# Patient Record
Sex: FEMALE | Race: WHITE | Employment: UNEMPLOYED | URBAN - METROPOLITAN AREA
[De-identification: names, ages, dates, MRNs, and addresses within clinical notes are randomized per-mention and may not be internally consistent; named-entity substitution may affect disease eponyms.]

---

## 2017-03-16 ENCOUNTER — TELEPHONE (OUTPATIENT)
Dept: INTERNAL MEDICINE CLINIC | Age: 52
End: 2017-03-16

## 2017-03-16 DIAGNOSIS — Z13.9 SCREENING: Primary | ICD-10-CM

## 2017-03-17 RX ORDER — LEVOTHYROXINE SODIUM 0.2 MG/1
TABLET ORAL
Qty: 30 TABLET | Refills: 1 | Status: SHIPPED | OUTPATIENT
Start: 2017-03-17 | End: 2017-04-07 | Stop reason: SDUPTHER

## 2017-04-07 ENCOUNTER — OFFICE VISIT (OUTPATIENT)
Dept: INTERNAL MEDICINE CLINIC | Age: 52
End: 2017-04-07
Payer: COMMERCIAL

## 2017-04-07 VITALS
HEIGHT: 62 IN | HEART RATE: 77 BPM | OXYGEN SATURATION: 98 % | DIASTOLIC BLOOD PRESSURE: 78 MMHG | BODY MASS INDEX: 35.51 KG/M2 | SYSTOLIC BLOOD PRESSURE: 130 MMHG | RESPIRATION RATE: 16 BRPM | TEMPERATURE: 98.2 F | WEIGHT: 193 LBS

## 2017-04-07 DIAGNOSIS — G43.009 NONINTRACTABLE MIGRAINE, UNSPECIFIED MIGRAINE TYPE: Primary | ICD-10-CM

## 2017-04-07 DIAGNOSIS — J31.0 CHRONIC RHINITIS: ICD-10-CM

## 2017-04-07 DIAGNOSIS — J45.21 MILD INTERMITTENT ASTHMA WITH ACUTE EXACERBATION: ICD-10-CM

## 2017-04-07 DIAGNOSIS — M46.1 SACROILIITIS (HCC): ICD-10-CM

## 2017-04-07 DIAGNOSIS — L30.9 DERMATITIS: ICD-10-CM

## 2017-04-07 DIAGNOSIS — Z13.29 THYROID DISORDER SCREENING: ICD-10-CM

## 2017-04-07 DIAGNOSIS — E55.9 VITAMIN D DEFICIENCY: ICD-10-CM

## 2017-04-07 DIAGNOSIS — R55 NEUROCARDIOGENIC SYNCOPE: ICD-10-CM

## 2017-04-07 DIAGNOSIS — M79.7 FIBROMYALGIA: ICD-10-CM

## 2017-04-07 DIAGNOSIS — K59.04 CHRONIC IDIOPATHIC CONSTIPATION: ICD-10-CM

## 2017-04-07 DIAGNOSIS — E03.9 ACQUIRED HYPOTHYROIDISM: ICD-10-CM

## 2017-04-07 DIAGNOSIS — G40.A09 PETIT MAL (HCC): ICD-10-CM

## 2017-04-07 DIAGNOSIS — M54.17 LUMBOSACRAL RADICULOPATHY AT S1: ICD-10-CM

## 2017-04-07 DIAGNOSIS — J45.20 MILD INTERMITTENT ASTHMA WITHOUT COMPLICATION: ICD-10-CM

## 2017-04-07 PROCEDURE — 90670 PCV13 VACCINE IM: CPT | Performed by: FAMILY MEDICINE

## 2017-04-07 PROCEDURE — 99214 OFFICE O/P EST MOD 30 MIN: CPT | Performed by: FAMILY MEDICINE

## 2017-04-07 PROCEDURE — G0009 ADMIN PNEUMOCOCCAL VACCINE: HCPCS | Performed by: FAMILY MEDICINE

## 2017-04-07 RX ORDER — DESOXIMETASONE 2.5 MG/G
CREAM TOPICAL
Qty: 60 G | Refills: 1 | Status: SHIPPED | OUTPATIENT
Start: 2017-04-07 | End: 2017-12-08 | Stop reason: ALTCHOICE

## 2017-04-07 RX ORDER — ERGOCALCIFEROL 1.25 MG/1
50000 CAPSULE ORAL WEEKLY
Qty: 30 CAPSULE | Refills: 3 | Status: SHIPPED | OUTPATIENT
Start: 2017-04-07 | End: 2017-12-08 | Stop reason: SDUPTHER

## 2017-04-07 RX ORDER — CYCLOBENZAPRINE HCL 5 MG
5 TABLET ORAL 2 TIMES DAILY PRN
Qty: 30 TABLET | Refills: 5 | Status: SHIPPED | OUTPATIENT
Start: 2017-04-07

## 2017-04-07 RX ORDER — VITAMIN A ACETATE, BETA CAROTENE, ASCORBIC ACID, CHOLECALCIFEROL, .ALPHA.-TOCOPHEROL ACETATE, DL-, THIAMINE MONONITRATE, RIBOFLAVIN, NIACINAMIDE, PYRIDOXINE HYDROCHLORIDE, FOLIC ACID, CYANOCOBALAMIN, CALCIUM CARBONATE, FERROUS FUMARATE, ZINC OXIDE, CUPRIC OXIDE 3080; 12; 120; 400; 1; 1.84; 3; 20; 22; 920; 25; 200; 27; 10; 2 [IU]/1; UG/1; MG/1; [IU]/1; MG/1; MG/1; MG/1; MG/1; MG/1; [IU]/1; MG/1; MG/1; MG/1; MG/1; MG/1
1 TABLET, FILM COATED ORAL DAILY
Qty: 30 TABLET | Refills: 5 | Status: SHIPPED | OUTPATIENT
Start: 2017-04-07 | End: 2017-12-08 | Stop reason: SDUPTHER

## 2017-04-07 RX ORDER — LORATADINE 10 MG/1
10 TABLET ORAL DAILY
Qty: 30 TABLET | Refills: 1 | Status: SHIPPED | OUTPATIENT
Start: 2017-04-07 | End: 2017-12-08 | Stop reason: ALTCHOICE

## 2017-04-07 RX ORDER — ATENOLOL 25 MG/1
TABLET ORAL
Qty: 30 TABLET | Refills: 3 | Status: SHIPPED | OUTPATIENT
Start: 2017-04-07 | End: 2017-07-31 | Stop reason: SDUPTHER

## 2017-04-07 RX ORDER — FLUTICASONE PROPIONATE 110 UG/1
AEROSOL, METERED RESPIRATORY (INHALATION)
Qty: 12 G | Refills: 3 | Status: SHIPPED | OUTPATIENT
Start: 2017-04-07 | End: 2017-12-08

## 2017-04-07 RX ORDER — TRAMADOL HYDROCHLORIDE 50 MG/1
50 TABLET ORAL EVERY 6 HOURS PRN
Qty: 20 TABLET | Refills: 1 | Status: SHIPPED | OUTPATIENT
Start: 2017-04-07 | End: 2017-04-17

## 2017-04-07 RX ORDER — DULOXETIN HYDROCHLORIDE 60 MG/1
CAPSULE, DELAYED RELEASE ORAL
Qty: 30 CAPSULE | Refills: 5 | Status: SHIPPED | OUTPATIENT
Start: 2017-04-07 | End: 2017-09-30 | Stop reason: SDUPTHER

## 2017-04-07 RX ORDER — LEVOTHYROXINE SODIUM 0.2 MG/1
TABLET ORAL
Qty: 30 TABLET | Refills: 5 | Status: SHIPPED | OUTPATIENT
Start: 2017-04-07 | End: 2017-07-16

## 2017-04-07 RX ORDER — ALBUTEROL SULFATE 90 UG/1
AEROSOL, METERED RESPIRATORY (INHALATION)
Qty: 1 INHALER | Refills: 5 | Status: SHIPPED | OUTPATIENT
Start: 2017-04-07 | End: 2017-12-08 | Stop reason: SDUPTHER

## 2017-04-07 ASSESSMENT — ENCOUNTER SYMPTOMS
STRIDOR: 0
BACK PAIN: 0
EYE REDNESS: 0
FACIAL SWELLING: 0
TROUBLE SWALLOWING: 0
EYE DISCHARGE: 0
ABDOMINAL PAIN: 0
CHEST TIGHTNESS: 0
CONSTIPATION: 0
SHORTNESS OF BREATH: 0
COLOR CHANGE: 0
WHEEZING: 0
ABDOMINAL DISTENTION: 0
ANAL BLEEDING: 0
EYE PAIN: 0
SORE THROAT: 0
COUGH: 0

## 2017-04-07 ASSESSMENT — PATIENT HEALTH QUESTIONNAIRE - PHQ9
SUM OF ALL RESPONSES TO PHQ QUESTIONS 1-9: 0
SUM OF ALL RESPONSES TO PHQ9 QUESTIONS 1 & 2: 0
1. LITTLE INTEREST OR PLEASURE IN DOING THINGS: 0

## 2017-04-10 ENCOUNTER — TELEPHONE (OUTPATIENT)
Dept: INTERNAL MEDICINE CLINIC | Age: 52
End: 2017-04-10

## 2017-04-18 ENCOUNTER — TELEPHONE (OUTPATIENT)
Dept: INTERNAL MEDICINE CLINIC | Age: 52
End: 2017-04-18

## 2017-07-15 ENCOUNTER — HOSPITAL ENCOUNTER (OUTPATIENT)
Age: 52
Discharge: HOME | End: 2017-07-15
Payer: COMMERCIAL

## 2017-07-15 DIAGNOSIS — Z13.9 SCREENING: ICD-10-CM

## 2017-07-15 LAB
THYROXINE, FREE: 2.01 NG/DL (ref 0.93–1.7)
TSH SERPL DL<=0.05 MIU/L-ACNC: 0.01 MIU/L (ref 0.3–5)

## 2017-07-15 PROCEDURE — 36415 COLL VENOUS BLD VENIPUNCTURE: CPT

## 2017-07-15 PROCEDURE — 84443 ASSAY THYROID STIM HORMONE: CPT

## 2017-07-15 PROCEDURE — 84439 ASSAY OF FREE THYROXINE: CPT

## 2017-07-16 DIAGNOSIS — E03.9 ACQUIRED HYPOTHYROIDISM: Primary | ICD-10-CM

## 2017-07-16 RX ORDER — LEVOTHYROXINE SODIUM 0.15 MG/1
150 TABLET ORAL DAILY
Qty: 30 TABLET | Refills: 3 | Status: SHIPPED | OUTPATIENT
Start: 2017-07-16 | End: 2017-07-26 | Stop reason: CLARIF

## 2017-07-26 RX ORDER — LEVOTHYROXINE SODIUM 0.15 MG/1
150 TABLET ORAL DAILY
Qty: 30 TABLET | Refills: 3 | Status: SHIPPED | OUTPATIENT
Start: 2017-07-26 | End: 2017-12-08 | Stop reason: DRUGHIGH

## 2017-07-31 DIAGNOSIS — G43.009 NONINTRACTABLE MIGRAINE, UNSPECIFIED MIGRAINE TYPE: ICD-10-CM

## 2017-07-31 RX ORDER — ATENOLOL 25 MG/1
TABLET ORAL
Qty: 30 TABLET | Refills: 3 | Status: SHIPPED | OUTPATIENT
Start: 2017-07-31 | End: 2018-08-22 | Stop reason: ALTCHOICE

## 2017-09-14 ENCOUNTER — HOSPITAL ENCOUNTER (OUTPATIENT)
Age: 52
Discharge: HOME | End: 2017-09-14
Payer: COMMERCIAL

## 2017-09-14 DIAGNOSIS — E03.9 ACQUIRED HYPOTHYROIDISM: ICD-10-CM

## 2017-09-14 LAB
THYROXINE, FREE: 1.66 NG/DL (ref 0.93–1.7)
TSH SERPL DL<=0.05 MIU/L-ACNC: 0.03 MIU/L (ref 0.3–5)

## 2017-09-14 PROCEDURE — 36415 COLL VENOUS BLD VENIPUNCTURE: CPT

## 2017-09-14 PROCEDURE — 84439 ASSAY OF FREE THYROXINE: CPT

## 2017-09-14 PROCEDURE — 84443 ASSAY THYROID STIM HORMONE: CPT

## 2017-09-14 RX ORDER — LEVOTHYROXINE SODIUM 0.12 MG/1
125 TABLET ORAL DAILY
Qty: 30 TABLET | Refills: 5 | Status: SHIPPED | OUTPATIENT
Start: 2017-09-14 | End: 2017-12-08 | Stop reason: SDUPTHER

## 2017-09-15 ENCOUNTER — TELEPHONE (OUTPATIENT)
Dept: INTERNAL MEDICINE CLINIC | Age: 52
End: 2017-09-15

## 2017-09-15 DIAGNOSIS — Z13.29 THYROID DISORDER SCREENING: Primary | ICD-10-CM

## 2017-10-02 RX ORDER — DULOXETIN HYDROCHLORIDE 60 MG/1
CAPSULE, DELAYED RELEASE ORAL
Qty: 30 CAPSULE | Refills: 0 | Status: SHIPPED | OUTPATIENT
Start: 2017-10-02 | End: 2017-12-08 | Stop reason: SDUPTHER

## 2017-12-06 ENCOUNTER — TELEPHONE (OUTPATIENT)
Dept: INTERNAL MEDICINE CLINIC | Age: 52
End: 2017-12-06

## 2017-12-08 ENCOUNTER — OFFICE VISIT (OUTPATIENT)
Dept: INTERNAL MEDICINE CLINIC | Age: 52
End: 2017-12-08
Payer: COMMERCIAL

## 2017-12-08 VITALS
OXYGEN SATURATION: 98 % | DIASTOLIC BLOOD PRESSURE: 88 MMHG | HEIGHT: 62 IN | WEIGHT: 198.2 LBS | RESPIRATION RATE: 16 BRPM | SYSTOLIC BLOOD PRESSURE: 130 MMHG | HEART RATE: 68 BPM | BODY MASS INDEX: 36.47 KG/M2

## 2017-12-08 DIAGNOSIS — Z12.39 SCREENING BREAST EXAMINATION: ICD-10-CM

## 2017-12-08 DIAGNOSIS — G89.4 CHRONIC PAIN SYNDROME: ICD-10-CM

## 2017-12-08 DIAGNOSIS — J45.20 MILD INTERMITTENT ASTHMA WITHOUT COMPLICATION: ICD-10-CM

## 2017-12-08 DIAGNOSIS — R51.9 CHRONIC DAILY HEADACHE: ICD-10-CM

## 2017-12-08 DIAGNOSIS — I10 ESSENTIAL HYPERTENSION: ICD-10-CM

## 2017-12-08 DIAGNOSIS — K59.04 CHRONIC IDIOPATHIC CONSTIPATION: ICD-10-CM

## 2017-12-08 DIAGNOSIS — M54.17 LUMBOSACRAL RADICULOPATHY AT S1: ICD-10-CM

## 2017-12-08 DIAGNOSIS — R53.83 OTHER FATIGUE: Primary | ICD-10-CM

## 2017-12-08 DIAGNOSIS — E55.9 VITAMIN D DEFICIENCY: ICD-10-CM

## 2017-12-08 DIAGNOSIS — J30.9 CHRONIC ALLERGIC RHINITIS, UNSPECIFIED SEASONALITY, UNSPECIFIED TRIGGER: ICD-10-CM

## 2017-12-08 DIAGNOSIS — E03.8 OTHER SPECIFIED HYPOTHYROIDISM: ICD-10-CM

## 2017-12-08 PROCEDURE — 3014F SCREEN MAMMO DOC REV: CPT | Performed by: FAMILY MEDICINE

## 2017-12-08 PROCEDURE — G8427 DOCREV CUR MEDS BY ELIG CLIN: HCPCS | Performed by: FAMILY MEDICINE

## 2017-12-08 PROCEDURE — 99214 OFFICE O/P EST MOD 30 MIN: CPT | Performed by: FAMILY MEDICINE

## 2017-12-08 PROCEDURE — 90732 PPSV23 VACC 2 YRS+ SUBQ/IM: CPT | Performed by: FAMILY MEDICINE

## 2017-12-08 PROCEDURE — 4004F PT TOBACCO SCREEN RCVD TLK: CPT | Performed by: FAMILY MEDICINE

## 2017-12-08 PROCEDURE — G0008 ADMIN INFLUENZA VIRUS VAC: HCPCS | Performed by: FAMILY MEDICINE

## 2017-12-08 PROCEDURE — G0009 ADMIN PNEUMOCOCCAL VACCINE: HCPCS | Performed by: FAMILY MEDICINE

## 2017-12-08 PROCEDURE — 3017F COLORECTAL CA SCREEN DOC REV: CPT | Performed by: FAMILY MEDICINE

## 2017-12-08 PROCEDURE — G8484 FLU IMMUNIZE NO ADMIN: HCPCS | Performed by: FAMILY MEDICINE

## 2017-12-08 PROCEDURE — G8417 CALC BMI ABV UP PARAM F/U: HCPCS | Performed by: FAMILY MEDICINE

## 2017-12-08 PROCEDURE — 90688 IIV4 VACCINE SPLT 0.5 ML IM: CPT | Performed by: FAMILY MEDICINE

## 2017-12-08 RX ORDER — FAMOTIDINE 10 MG
10 TABLET ORAL 2 TIMES DAILY
COMMUNITY

## 2017-12-08 RX ORDER — DULOXETIN HYDROCHLORIDE 60 MG/1
60 CAPSULE, DELAYED RELEASE ORAL DAILY
Qty: 30 CAPSULE | Refills: 2 | Status: SHIPPED | OUTPATIENT
Start: 2017-12-08 | End: 2018-04-22 | Stop reason: SDUPTHER

## 2017-12-08 RX ORDER — LEVOTHYROXINE SODIUM 0.12 MG/1
125 TABLET ORAL DAILY
Qty: 30 TABLET | Refills: 5 | Status: SHIPPED | OUTPATIENT
Start: 2017-12-08 | End: 2018-08-07 | Stop reason: SDUPTHER

## 2017-12-08 RX ORDER — ERGOCALCIFEROL 1.25 MG/1
50000 CAPSULE ORAL WEEKLY
Qty: 30 CAPSULE | Refills: 3 | Status: SHIPPED | OUTPATIENT
Start: 2017-12-08 | End: 2018-12-10 | Stop reason: SDUPTHER

## 2017-12-08 RX ORDER — VITAMIN A ACETATE, BETA CAROTENE, ASCORBIC ACID, CHOLECALCIFEROL, .ALPHA.-TOCOPHEROL ACETATE, DL-, THIAMINE MONONITRATE, RIBOFLAVIN, NIACINAMIDE, PYRIDOXINE HYDROCHLORIDE, FOLIC ACID, CYANOCOBALAMIN, CALCIUM CARBONATE, FERROUS FUMARATE, ZINC OXIDE, CUPRIC OXIDE 3080; 12; 120; 400; 1; 1.84; 3; 20; 22; 920; 25; 200; 27; 10; 2 [IU]/1; UG/1; MG/1; [IU]/1; MG/1; MG/1; MG/1; MG/1; MG/1; [IU]/1; MG/1; MG/1; MG/1; MG/1; MG/1
1 TABLET, FILM COATED ORAL DAILY
Qty: 30 TABLET | Refills: 5 | Status: SHIPPED | OUTPATIENT
Start: 2017-12-08 | End: 2018-01-17 | Stop reason: ALTCHOICE

## 2017-12-08 RX ORDER — ALBUTEROL SULFATE 90 UG/1
AEROSOL, METERED RESPIRATORY (INHALATION)
Qty: 1 INHALER | Refills: 5 | Status: SHIPPED | OUTPATIENT
Start: 2017-12-08

## 2017-12-08 RX ORDER — FLUTICASONE PROPIONATE 50 MCG
1 SPRAY, SUSPENSION (ML) NASAL DAILY
Qty: 1 BOTTLE | Refills: 3 | Status: SHIPPED | OUTPATIENT
Start: 2017-12-08 | End: 2018-08-17 | Stop reason: ALTCHOICE

## 2017-12-08 ASSESSMENT — ENCOUNTER SYMPTOMS
EYES NEGATIVE: 1
GASTROINTESTINAL NEGATIVE: 1
RESPIRATORY NEGATIVE: 1
ALLERGIC/IMMUNOLOGIC NEGATIVE: 1

## 2017-12-08 NOTE — PROGRESS NOTES
chronic pain syndrome. She denies suicidality   Vitals reviewed. Assessment:      1. Other fatigue  Lipid Panel    TSH With Reflex Ft4    CBC With Auto Differential    Comprehensive Metabolic Panel    NORMA DIGITAL SCREEN W OR WO CAD BILATERAL    Hepatitis C Antibody    HIV Screen    Hepatitis Panel, Acute    HIV Rapid 1&2    T. pallidum Ab    VDRL, QUANTITATIVE    Hemoglobin A1C   2. Essential hypertension  Lipid Panel    TSH With Reflex Ft4    CBC With Auto Differential    Comprehensive Metabolic Panel    NORMA DIGITAL SCREEN W OR WO CAD BILATERAL    Hepatitis C Antibody    HIV Screen    Hepatitis Panel, Acute    HIV Rapid 1&2    T. pallidum Ab    VDRL, QUANTITATIVE    Hemoglobin A1C   3. Mild intermittent asthma without complication  albuterol sulfate HFA (PROAIR HFA) 108 (90 Base) MCG/ACT inhaler   4. Screening breast examination     5. Other specified hypothyroidism     6. Vitamin D deficiency  vitamin D (ERGOCALCIFEROL) 76968 units CAPS capsule    Prenatal Vit-Fe Fumarate-FA (PRENATAL PLUS) 27-1 MG TABS   7. Chronic idiopathic constipation  linaclotide (LINZESS) 145 MCG capsule   8. Chronic allergic rhinitis, unspecified seasonality, unspecified trigger  fluticasone (FLONASE) 50 MCG/ACT nasal spray   9. Chronic pain syndrome  [facility]    XR LUMBAR SPINE (2-3 VIEWS)   10. Chronic daily headache  Lipid Panel    TSH With Reflex Ft4    CBC With Auto Differential    Comprehensive Metabolic Panel    NORMA DIGITAL SCREEN W OR WO CAD BILATERAL    Hepatitis C Antibody    HIV Screen    Hepatitis Panel, Acute    HIV Rapid 1&2    T. pallidum Ab    VDRL, QUANTITATIVE    Hemoglobin A1C    [facility]   11. Lumbosacral radiculopathy at S1  [facility]    XR LUMBAR SPINE (2-3 VIEWS)           Plan:      51-year-old overweight  female is presented to reestablish.   This is the first time I'm seeing this individual.  Subjective complaint of fatigue is

## 2017-12-13 ENCOUNTER — TELEPHONE (OUTPATIENT)
Dept: INTERNAL MEDICINE CLINIC | Age: 52
End: 2017-12-13

## 2017-12-16 ENCOUNTER — HOSPITAL ENCOUNTER (OUTPATIENT)
Age: 52
Discharge: HOME | End: 2017-12-16
Payer: COMMERCIAL

## 2017-12-16 ENCOUNTER — HOSPITAL ENCOUNTER (OUTPATIENT)
Dept: GENERAL RADIOLOGY | Age: 52
Discharge: HOME | End: 2017-12-16
Payer: COMMERCIAL

## 2017-12-16 DIAGNOSIS — R51.9 CHRONIC DAILY HEADACHE: ICD-10-CM

## 2017-12-16 DIAGNOSIS — I10 ESSENTIAL HYPERTENSION: ICD-10-CM

## 2017-12-16 DIAGNOSIS — M54.17 LUMBOSACRAL RADICULOPATHY AT S1: ICD-10-CM

## 2017-12-16 DIAGNOSIS — R53.83 OTHER FATIGUE: ICD-10-CM

## 2017-12-16 DIAGNOSIS — G89.4 CHRONIC PAIN SYNDROME: ICD-10-CM

## 2017-12-16 LAB
ABSOLUTE EOS #: 0.5 K/UL (ref 0–0.4)
ABSOLUTE IMMATURE GRANULOCYTE: ABNORMAL K/UL (ref 0–0.3)
ABSOLUTE LYMPH #: 2.3 K/UL (ref 1–4.8)
ABSOLUTE MONO #: 0.5 K/UL (ref 0.1–1.3)
ALBUMIN SERPL-MCNC: 3.6 G/DL (ref 3.5–5.2)
ALBUMIN/GLOBULIN RATIO: NORMAL (ref 1–2.5)
ALP BLD-CCNC: 99 U/L (ref 35–104)
ALT SERPL-CCNC: 11 U/L (ref 5–33)
ANION GAP SERPL CALCULATED.3IONS-SCNC: 11 MMOL/L (ref 9–17)
AST SERPL-CCNC: 19 U/L
BASOPHILS # BLD: 1 % (ref 0–2)
BASOPHILS ABSOLUTE: 0.1 K/UL (ref 0–0.2)
BILIRUB SERPL-MCNC: 0.33 MG/DL (ref 0.3–1.2)
BUN BLDV-MCNC: 6 MG/DL (ref 6–20)
BUN/CREAT BLD: NORMAL (ref 9–20)
CALCIUM SERPL-MCNC: 9.2 MG/DL (ref 8.6–10.4)
CHLORIDE BLD-SCNC: 100 MMOL/L (ref 98–107)
CHOLESTEROL/HDL RATIO: 3.6
CHOLESTEROL: 208 MG/DL
CO2: 26 MMOL/L (ref 20–31)
CREAT SERPL-MCNC: 0.6 MG/DL (ref 0.5–0.9)
DIFFERENTIAL TYPE: ABNORMAL
EOSINOPHILS RELATIVE PERCENT: 8 % (ref 0–4)
GFR AFRICAN AMERICAN: >60 ML/MIN
GFR NON-AFRICAN AMERICAN: >60 ML/MIN
GFR SERPL CREATININE-BSD FRML MDRD: NORMAL ML/MIN/{1.73_M2}
GFR SERPL CREATININE-BSD FRML MDRD: NORMAL ML/MIN/{1.73_M2}
GLUCOSE BLD-MCNC: 86 MG/DL (ref 70–99)
HAV IGM SER IA-ACNC: NONREACTIVE
HCT VFR BLD CALC: 36.8 % (ref 36–46)
HDLC SERPL-MCNC: 58 MG/DL
HEMOGLOBIN: 12.1 G/DL (ref 12–16)
HEPATITIS B CORE IGM ANTIBODY: NONREACTIVE
HEPATITIS B SURFACE ANTIGEN: NONREACTIVE
HEPATITIS C ANTIBODY: NONREACTIVE
HIV AG/AB: NONREACTIVE
IMMATURE GRANULOCYTES: ABNORMAL %
LDL CHOLESTEROL: 133 MG/DL (ref 0–130)
LYMPHOCYTES # BLD: 37 % (ref 24–44)
MCH RBC QN AUTO: 28 PG (ref 26–34)
MCHC RBC AUTO-ENTMCNC: 32.9 G/DL (ref 31–37)
MCV RBC AUTO: 84.9 FL (ref 80–100)
MONOCYTES # BLD: 7 % (ref 1–7)
PDW BLD-RTO: 16.9 % (ref 11.5–14.9)
PLATELET # BLD: 324 K/UL (ref 150–450)
PLATELET ESTIMATE: ABNORMAL
PMV BLD AUTO: 7.9 FL (ref 6–12)
POTASSIUM SERPL-SCNC: 4.2 MMOL/L (ref 3.7–5.3)
RBC # BLD: 4.33 M/UL (ref 4–5.2)
RBC # BLD: ABNORMAL 10*6/UL
SEG NEUTROPHILS: 47 % (ref 36–66)
SEGMENTED NEUTROPHILS ABSOLUTE COUNT: 3 K/UL (ref 1.3–9.1)
SODIUM BLD-SCNC: 137 MMOL/L (ref 135–144)
T. PALLIDUM, IGG: NONREACTIVE
TOTAL PROTEIN: 7.2 G/DL (ref 6.4–8.3)
TRIGL SERPL-MCNC: 86 MG/DL
TSH SERPL DL<=0.05 MIU/L-ACNC: 0.49 MIU/L (ref 0.3–5)
VLDLC SERPL CALC-MCNC: ABNORMAL MG/DL (ref 1–30)
WBC # BLD: 6.4 K/UL (ref 3.5–11)
WBC # BLD: ABNORMAL 10*3/UL

## 2017-12-16 PROCEDURE — 80074 ACUTE HEPATITIS PANEL: CPT

## 2017-12-16 PROCEDURE — 72100 X-RAY EXAM L-S SPINE 2/3 VWS: CPT

## 2017-12-16 PROCEDURE — 80061 LIPID PANEL: CPT

## 2017-12-16 PROCEDURE — 87389 HIV-1 AG W/HIV-1&-2 AB AG IA: CPT

## 2017-12-16 PROCEDURE — 83036 HEMOGLOBIN GLYCOSYLATED A1C: CPT

## 2017-12-16 PROCEDURE — 85025 COMPLETE CBC W/AUTO DIFF WBC: CPT

## 2017-12-16 PROCEDURE — 80053 COMPREHEN METABOLIC PANEL: CPT

## 2017-12-16 PROCEDURE — 36415 COLL VENOUS BLD VENIPUNCTURE: CPT

## 2017-12-16 PROCEDURE — 86593 SYPHILIS TEST NON-TREP QUANT: CPT

## 2017-12-16 PROCEDURE — 84443 ASSAY THYROID STIM HORMONE: CPT

## 2017-12-16 PROCEDURE — 86780 TREPONEMA PALLIDUM: CPT

## 2017-12-17 LAB
ESTIMATED AVERAGE GLUCOSE: 117 MG/DL
HBA1C MFR BLD: 5.7 % (ref 4–6)

## 2017-12-19 DIAGNOSIS — N64.4 BREAST TENDERNESS: ICD-10-CM

## 2017-12-19 DIAGNOSIS — N64.52 DISCHARGE FROM BOTH NIPPLES: Primary | ICD-10-CM

## 2017-12-19 LAB — VDRL, QUANTITATIVE: NEGATIVE

## 2017-12-19 NOTE — PROGRESS NOTES
Patient was ordered a mammogram but called with complaints of discharge and tenderness so radiology is requesting a Diagnostic mammogram. Orders were made

## 2017-12-21 ENCOUNTER — HOSPITAL ENCOUNTER (OUTPATIENT)
Age: 52
Discharge: HOME | End: 2017-12-21
Payer: COMMERCIAL

## 2017-12-21 ENCOUNTER — HOSPITAL ENCOUNTER (OUTPATIENT)
Dept: PAIN MANAGEMENT | Age: 52
Discharge: HOME | End: 2017-12-21
Payer: COMMERCIAL

## 2017-12-21 ENCOUNTER — HOSPITAL ENCOUNTER (OUTPATIENT)
Dept: GENERAL RADIOLOGY | Age: 52
Discharge: HOME | End: 2017-12-21
Payer: COMMERCIAL

## 2017-12-21 VITALS
HEART RATE: 75 BPM | DIASTOLIC BLOOD PRESSURE: 87 MMHG | HEIGHT: 62 IN | SYSTOLIC BLOOD PRESSURE: 136 MMHG | BODY MASS INDEX: 35.15 KG/M2 | WEIGHT: 191 LBS | RESPIRATION RATE: 18 BRPM | TEMPERATURE: 98.1 F

## 2017-12-21 DIAGNOSIS — M25.552 LEFT HIP PAIN: ICD-10-CM

## 2017-12-21 DIAGNOSIS — M25.551 RIGHT HIP PAIN: ICD-10-CM

## 2017-12-21 DIAGNOSIS — M54.17 LUMBOSACRAL RADICULOPATHY AT S1: Primary | ICD-10-CM

## 2017-12-21 PROCEDURE — 73502 X-RAY EXAM HIP UNI 2-3 VIEWS: CPT

## 2017-12-21 PROCEDURE — 99204 OFFICE O/P NEW MOD 45 MIN: CPT | Performed by: PAIN MEDICINE

## 2017-12-21 PROCEDURE — 99203 OFFICE O/P NEW LOW 30 MIN: CPT

## 2017-12-21 RX ORDER — GABAPENTIN 300 MG/1
300 CAPSULE ORAL NIGHTLY
COMMUNITY
End: 2017-12-21 | Stop reason: SDUPTHER

## 2017-12-21 RX ORDER — GABAPENTIN 300 MG/1
300 CAPSULE ORAL NIGHTLY
Qty: 30 CAPSULE | Refills: 0 | Status: SHIPPED | OUTPATIENT
Start: 2017-12-21 | End: 2018-08-22 | Stop reason: ALTCHOICE

## 2017-12-21 ASSESSMENT — ENCOUNTER SYMPTOMS
SUSPICIOUS LESIONS: 0
BACK PAIN: 1
BLURRED VISION: 0
HEMOPTYSIS: 0
ABDOMINAL PAIN: 0

## 2017-12-21 NOTE — PROGRESS NOTES
HPI:     Back Pain   This is a chronic problem. The current episode started more than 1 year ago. The problem occurs constantly. The problem has been gradually worsening since onset. The pain is present in the thoracic spine. The quality of the pain is described as burning. The pain does not radiate. The pain is at a severity of 8/10. The pain is moderate. The pain is the same all the time. The symptoms are aggravated by sitting, standing and twisting. Stiffness is present all day. Pertinent negatives include no abdominal pain, bladder incontinence, chest pain or fever. She has tried muscle relaxant, heat, analgesics and chiropractic manipulation for the symptoms. The treatment provided mild relief. Multiple pain complaints. Also complaining of neck and bilateral hip into the groin. Also complaining of neck pain. MRI average of 13 with disc bulging. She did have epidural steroid years ago with some benefit. States she is on Lyrica in the past while she was living in [place]. Currently on Cymbalta in the past with mild benefit. Was also on gabapentin in the past with benefit. X-ray L spine without any significant degenerative changes. Patient denies any new neurological symptoms. No bowel or bladder incontinence, no weakness, and no falling. Review of OARRS does not show any aberrant prescription behavior. Medication is helping the patient stay active. Patient denies any side effects and reports adequate analgesia. No sign of misuse/abuse.     Past Medical History:   Diagnosis Date    Arthritis     spinal, right hip    Carpal tunnel syndrome     rediagnosed in 2009 mary    Constipation     on med    Fibromyalgia     Headache(784.0)     migraines    Hyperlipidemia     history of, none since gastric bypass    Hypertension     history of, none since gastric bypass    Hypothyroidism     Osteoarthritis        Past Surgical History:   Procedure Laterality Date    CARPAL TUNNEL RELEASE Bilateral   SECTION      DILATION AND CURETTAGE OF UTERUS      miscarriage    DILATION AND CURETTAGE OF UTERUS  2013    hysteroscopy, novasure, cervical biopsy    GASTRIC BYPASS SURGERY  12    weighed 338lb    TUBAL LIGATION         Allergies   Allergen Reactions    Nsaids      Pt had gastric bypass    Other Itching and Swelling     Iodine in shrimp and kelp    Iodides Rash and Swelling     In food only, mouth and throat          Current Outpatient Prescriptions:     famotidine (PEPCID) 10 MG tablet, Take 10 mg by mouth 2 times daily, Disp: , Rfl:     vitamin D (ERGOCALCIFEROL) 73926 units CAPS capsule, Take 1 capsule by mouth once a week, Disp: 30 capsule, Rfl: 3    linaclotide (LINZESS) 145 MCG capsule, take 1 capsule by mouth every morning before BREAKFAST, Disp: 30 capsule, Rfl: 5    levothyroxine (SYNTHROID) 125 MCG tablet, Take 1 tablet by mouth Daily, Disp: 30 tablet, Rfl: 5    fluticasone (FLONASE) 50 MCG/ACT nasal spray, 1 spray by Nasal route daily, Disp: 1 Bottle, Rfl: 3    DULoxetine (CYMBALTA) 60 MG extended release capsule, Take 1 capsule by mouth daily, Disp: 30 capsule, Rfl: 2    albuterol sulfate HFA (PROAIR HFA) 108 (90 Base) MCG/ACT inhaler, inhale 2 puffs by mouth four times a day if needed, Disp: 1 Inhaler, Rfl: 5    atenolol (TENORMIN) 25 MG tablet, take 1 tablet by mouth once daily, Disp: 30 tablet, Rfl: 3    cyclobenzaprine (FLEXERIL) 5 MG tablet, Take 1 tablet by mouth 2 times daily as needed for Muscle spasms, Disp: 30 tablet, Rfl: 5    calcium carbonate-vitamin D (CALCIUM + D) 600-200 MG-UNIT TABS, Take 5 tablets by mouth once., Disp: , Rfl:     Prenatal Vit-Fe Fumarate-FA (PRENATAL PLUS) 27-1 MG TABS, Take 1 tablet by mouth daily, Disp: 30 tablet, Rfl: 5    Family History   Problem Relation Age of Onset    Mult Sclerosis Mother     Heart Attack Father      x 3    High Blood Pressure Father     Breast Cancer Other        Social History     Social History  Marital status:      Spouse name: N/A    Number of children: N/A    Years of education: N/A     Occupational History    Not on file. Social History Main Topics    Smoking status: Light Tobacco Smoker    Smokeless tobacco: Never Used    Alcohol use No    Drug use: No    Sexual activity: Yes     Partners: Male     Other Topics Concern    Not on file     Social History Narrative    No narrative on file       Review of Systems:  Review of Systems   Constitution: Negative for chills and fever. HENT: Negative for ear discharge. Eyes: Negative for blurred vision. Cardiovascular: Negative for chest pain. Respiratory: Negative for hemoptysis. Skin: Negative for suspicious lesions. Musculoskeletal: Positive for back pain. Negative for falls. Gastrointestinal: Negative for abdominal pain. Genitourinary: Negative for bladder incontinence. Psychiatric/Behavioral: Positive for memory loss. 10 point review of system was performed and negative as pertinent to chief complaint, except as stated in HPI. Physical Exam:  /87   Pulse 75   Temp 98.1 °F (36.7 °C) (Oral)   Resp 18   Ht 5' 2\" (1.575 m)   Wt 191 lb (86.6 kg)   BMI 34.93 kg/m²     Physical Exam   Constitutional: She is oriented to person, place, and time and well-developed, well-nourished, and in no distress. HENT:   Right Ear: External ear normal.   Left Ear: External ear normal.   Eyes: Conjunctivae are normal. Right eye exhibits no discharge. Left eye exhibits no discharge. Neck: No tracheal deviation present. No thyromegaly present. Pulmonary/Chest: Effort normal. No stridor. No respiratory distress. Neurological: She is alert and oriented to person, place, and time. Skin: Skin is warm and dry. She is not diaphoretic.    Psychiatric: Mood and memory normal.   Nursing note and vitals reviewed.  + MEL b/l    Record/Diagnostics Review:    As above, I did review the imaging    Assessment:  Low back

## 2018-01-10 ENCOUNTER — TELEPHONE (OUTPATIENT)
Dept: INTERNAL MEDICINE CLINIC | Age: 53
End: 2018-01-10

## 2018-01-10 RX ORDER — LANOLIN ALCOHOL/MO/W.PET/CERES
325 CREAM (GRAM) TOPICAL 2 TIMES DAILY
Qty: 90 TABLET | Refills: 0 | Status: SHIPPED | OUTPATIENT
Start: 2018-01-10 | End: 2018-03-25 | Stop reason: SDUPTHER

## 2018-01-10 NOTE — TELEPHONE ENCOUNTER
On the labs are well controlled. Advised to continue current regimen of medications.   She would benefit from low-fat high-fiber diet, daily moderate exercise and weight loss

## 2018-01-10 NOTE — TELEPHONE ENCOUNTER
Patient called in to cancel her appointment at 11:00 am with Dr. Clarisa Benitez because she was feeling light headed and did not feel safe to drive. She then stated that she felt ill enough to go to the emergency room but did not have anyone to take her. The patient was seen at [facility] to have labs drawn and upon completion was decovered to have low iron. The patient is following up with Dr. Josiah Mcneil office at [facility] to have a iron transfusion. I conferneced in [id number] from the office and explained to her what was going on with the patient she advised the patient to go to the emergency room an if she did not have transportation to call 911. The patient told me she was going to wait for a friend to get off work to take her to the emergency room and if the friend could not take her she would call the ambulance. In the mean time she is going to contact Dr. Josiah Mcneil office at [facility] to see if they can get her in for the iron transfusion, if not she will proceed to the emergency room when the friend is off of work.

## 2018-01-10 NOTE — TELEPHONE ENCOUNTER
Patient notified of the Ferrous Sulfate that was called into pharmacy. Pt stated she started  taking pre-natel vitamins with iron( 27 MG of iron)  on 1/6/2018. Pt asked if she should still take the Ferrous sulfate that has been called in.      Please advise

## 2018-01-15 ENCOUNTER — HOSPITAL ENCOUNTER (OUTPATIENT)
Dept: WOMENS IMAGING | Age: 53
Discharge: HOME | End: 2018-01-15
Payer: COMMERCIAL

## 2018-01-15 DIAGNOSIS — N64.52 DISCHARGE FROM BOTH NIPPLES: ICD-10-CM

## 2018-01-15 DIAGNOSIS — N64.4 BREAST TENDERNESS: ICD-10-CM

## 2018-01-15 PROCEDURE — G0279 TOMOSYNTHESIS, MAMMO: HCPCS

## 2018-03-26 RX ORDER — FERROUS SULFATE 325(65) MG
TABLET ORAL
Qty: 90 TABLET | Refills: 0 | Status: SHIPPED | OUTPATIENT
Start: 2018-03-26

## 2018-04-23 RX ORDER — DULOXETIN HYDROCHLORIDE 60 MG/1
CAPSULE, DELAYED RELEASE ORAL
Qty: 30 CAPSULE | Refills: 2 | Status: SHIPPED | OUTPATIENT
Start: 2018-04-23 | End: 2018-08-22 | Stop reason: ALTCHOICE

## 2018-06-04 ENCOUNTER — TELEPHONE (OUTPATIENT)
Dept: INTERNAL MEDICINE CLINIC | Age: 53
End: 2018-06-04

## 2018-06-04 DIAGNOSIS — R55 NEUROCARDIOGENIC SYNCOPE: Primary | ICD-10-CM

## 2018-07-10 ENCOUNTER — HOSPITAL ENCOUNTER (OUTPATIENT)
Age: 53
Discharge: HOME | End: 2018-07-10
Payer: COMMERCIAL

## 2018-07-10 DIAGNOSIS — R55 NEUROCARDIOGENIC SYNCOPE: ICD-10-CM

## 2018-07-10 LAB
ALBUMIN SERPL-MCNC: 3.8 G/DL (ref 3.5–5.2)
ALBUMIN/GLOBULIN RATIO: ABNORMAL (ref 1–2.5)
ALP BLD-CCNC: 77 U/L (ref 35–104)
ALT SERPL-CCNC: 9 U/L (ref 5–33)
ANION GAP SERPL CALCULATED.3IONS-SCNC: 11 MMOL/L (ref 9–17)
AST SERPL-CCNC: 16 U/L
BILIRUB SERPL-MCNC: 0.34 MG/DL (ref 0.3–1.2)
BUN BLDV-MCNC: 3 MG/DL (ref 6–20)
BUN/CREAT BLD: ABNORMAL (ref 9–20)
CALCIUM SERPL-MCNC: 8.7 MG/DL (ref 8.6–10.4)
CHLORIDE BLD-SCNC: 102 MMOL/L (ref 98–107)
CHOLESTEROL/HDL RATIO: 4.4
CHOLESTEROL: 260 MG/DL
CO2: 26 MMOL/L (ref 20–31)
CREAT SERPL-MCNC: 0.67 MG/DL (ref 0.5–0.9)
ESTIMATED AVERAGE GLUCOSE: 100 MG/DL
GFR AFRICAN AMERICAN: >60 ML/MIN
GFR NON-AFRICAN AMERICAN: >60 ML/MIN
GFR SERPL CREATININE-BSD FRML MDRD: ABNORMAL ML/MIN/{1.73_M2}
GFR SERPL CREATININE-BSD FRML MDRD: ABNORMAL ML/MIN/{1.73_M2}
GLUCOSE BLD-MCNC: 91 MG/DL (ref 70–99)
HAV IGM SER IA-ACNC: NONREACTIVE
HBA1C MFR BLD: 5.1 % (ref 4–6)
HCT VFR BLD CALC: 40.7 % (ref 36–46)
HDLC SERPL-MCNC: 59 MG/DL
HEMOGLOBIN: 14 G/DL (ref 12–16)
HEPATITIS B CORE IGM ANTIBODY: NONREACTIVE
HEPATITIS B SURFACE ANTIGEN: NONREACTIVE
HEPATITIS C ANTIBODY: NONREACTIVE
HIV AG/AB: NONREACTIVE
LDL CHOLESTEROL: 180 MG/DL (ref 0–130)
MCH RBC QN AUTO: 33.7 PG (ref 26–34)
MCHC RBC AUTO-ENTMCNC: 34.5 G/DL (ref 31–37)
MCV RBC AUTO: 97.6 FL (ref 80–100)
NRBC AUTOMATED: NORMAL PER 100 WBC
PDW BLD-RTO: 13.9 % (ref 11.5–14.9)
PLATELET # BLD: 274 K/UL (ref 150–450)
PMV BLD AUTO: 7.3 FL (ref 6–12)
POTASSIUM SERPL-SCNC: 4.1 MMOL/L (ref 3.7–5.3)
RBC # BLD: 4.17 M/UL (ref 4–5.2)
SODIUM BLD-SCNC: 139 MMOL/L (ref 135–144)
TOTAL PROTEIN: 6.9 G/DL (ref 6.4–8.3)
TRIGL SERPL-MCNC: 107 MG/DL
TSH SERPL DL<=0.05 MIU/L-ACNC: 7.08 MIU/L (ref 0.3–5)
VITAMIN D 25-HYDROXY: 33.6 NG/ML (ref 30–100)
VLDLC SERPL CALC-MCNC: ABNORMAL MG/DL (ref 1–30)
WBC # BLD: 8.4 K/UL (ref 3.5–11)

## 2018-07-10 PROCEDURE — 85027 COMPLETE CBC AUTOMATED: CPT

## 2018-07-10 PROCEDURE — 87389 HIV-1 AG W/HIV-1&-2 AB AG IA: CPT

## 2018-07-10 PROCEDURE — 84443 ASSAY THYROID STIM HORMONE: CPT

## 2018-07-10 PROCEDURE — 80061 LIPID PANEL: CPT

## 2018-07-10 PROCEDURE — 83036 HEMOGLOBIN GLYCOSYLATED A1C: CPT

## 2018-07-10 PROCEDURE — 80074 ACUTE HEPATITIS PANEL: CPT

## 2018-07-10 PROCEDURE — 86593 SYPHILIS TEST NON-TREP QUANT: CPT

## 2018-07-10 PROCEDURE — 36415 COLL VENOUS BLD VENIPUNCTURE: CPT

## 2018-07-10 PROCEDURE — 80053 COMPREHEN METABOLIC PANEL: CPT

## 2018-07-10 PROCEDURE — 82306 VITAMIN D 25 HYDROXY: CPT

## 2018-07-13 LAB — VDRL, QUANTITATIVE: NEGATIVE

## 2018-08-09 RX ORDER — LEVOTHYROXINE SODIUM 0.12 MG/1
125 TABLET ORAL DAILY
Qty: 10 TABLET | Refills: 0 | Status: SHIPPED | OUTPATIENT
Start: 2018-08-09 | End: 2018-08-17 | Stop reason: ALTCHOICE

## 2018-08-17 ENCOUNTER — OFFICE VISIT (OUTPATIENT)
Dept: INTERNAL MEDICINE CLINIC | Age: 53
End: 2018-08-17
Payer: COMMERCIAL

## 2018-08-17 ENCOUNTER — TELEPHONE (OUTPATIENT)
Dept: INTERNAL MEDICINE CLINIC | Age: 53
End: 2018-08-17

## 2018-08-17 VITALS
WEIGHT: 202 LBS | HEIGHT: 62 IN | OXYGEN SATURATION: 98 % | HEART RATE: 74 BPM | BODY MASS INDEX: 37.17 KG/M2 | SYSTOLIC BLOOD PRESSURE: 130 MMHG | DIASTOLIC BLOOD PRESSURE: 80 MMHG

## 2018-08-17 DIAGNOSIS — G43.009 MIGRAINE WITHOUT AURA AND WITHOUT STATUS MIGRAINOSUS, NOT INTRACTABLE: ICD-10-CM

## 2018-08-17 DIAGNOSIS — E66.01 MORBID OBESITY WITH BMI OF 40.0-44.9, ADULT (HCC): ICD-10-CM

## 2018-08-17 DIAGNOSIS — E55.9 VITAMIN D DEFICIENCY: ICD-10-CM

## 2018-08-17 DIAGNOSIS — E78.49 OTHER HYPERLIPIDEMIA: Primary | ICD-10-CM

## 2018-08-17 DIAGNOSIS — Z98.84 GASTRIC BYPASS STATUS FOR OBESITY: ICD-10-CM

## 2018-08-17 DIAGNOSIS — M79.7 FIBROMYALGIA: ICD-10-CM

## 2018-08-17 DIAGNOSIS — M54.17 LUMBOSACRAL RADICULOPATHY AT S1: ICD-10-CM

## 2018-08-17 DIAGNOSIS — R55 SYNCOPE, UNSPECIFIED SYNCOPE TYPE: ICD-10-CM

## 2018-08-17 DIAGNOSIS — Z71.6 TOBACCO ABUSE COUNSELING: ICD-10-CM

## 2018-08-17 DIAGNOSIS — Z78.0 POST-MENOPAUSAL: ICD-10-CM

## 2018-08-17 DIAGNOSIS — Z72.0 TOBACCO ABUSE: ICD-10-CM

## 2018-08-17 DIAGNOSIS — G89.4 CHRONIC PAIN SYNDROME: ICD-10-CM

## 2018-08-17 DIAGNOSIS — E03.9 HYPOTHYROIDISM, UNSPECIFIED TYPE: ICD-10-CM

## 2018-08-17 PROCEDURE — 4004F PT TOBACCO SCREEN RCVD TLK: CPT | Performed by: FAMILY MEDICINE

## 2018-08-17 PROCEDURE — G0444 DEPRESSION SCREEN ANNUAL: HCPCS | Performed by: FAMILY MEDICINE

## 2018-08-17 PROCEDURE — G8427 DOCREV CUR MEDS BY ELIG CLIN: HCPCS | Performed by: FAMILY MEDICINE

## 2018-08-17 PROCEDURE — 99214 OFFICE O/P EST MOD 30 MIN: CPT | Performed by: FAMILY MEDICINE

## 2018-08-17 PROCEDURE — G8417 CALC BMI ABV UP PARAM F/U: HCPCS | Performed by: FAMILY MEDICINE

## 2018-08-17 PROCEDURE — 3017F COLORECTAL CA SCREEN DOC REV: CPT | Performed by: FAMILY MEDICINE

## 2018-08-17 RX ORDER — LEVOTHYROXINE SODIUM 175 UG/1
175 TABLET ORAL DAILY
Qty: 30 TABLET | Refills: 3 | Status: SHIPPED | OUTPATIENT
Start: 2018-08-17

## 2018-08-17 RX ORDER — ATORVASTATIN CALCIUM 40 MG/1
40 TABLET, FILM COATED ORAL DAILY
Qty: 30 TABLET | Refills: 3 | Status: SHIPPED | OUTPATIENT
Start: 2018-08-17

## 2018-08-17 ASSESSMENT — PATIENT HEALTH QUESTIONNAIRE - PHQ9
9. THOUGHTS THAT YOU WOULD BE BETTER OFF DEAD, OR OF HURTING YOURSELF: 0
SUM OF ALL RESPONSES TO PHQ QUESTIONS 1-9: 13
SUM OF ALL RESPONSES TO PHQ QUESTIONS 1-9: 13
6. FEELING BAD ABOUT YOURSELF - OR THAT YOU ARE A FAILURE OR HAVE LET YOURSELF OR YOUR FAMILY DOWN: 0
5. POOR APPETITE OR OVEREATING: 3
1. LITTLE INTEREST OR PLEASURE IN DOING THINGS: 1
10. IF YOU CHECKED OFF ANY PROBLEMS, HOW DIFFICULT HAVE THESE PROBLEMS MADE IT FOR YOU TO DO YOUR WORK, TAKE CARE OF THINGS AT HOME, OR GET ALONG WITH OTHER PEOPLE: 0
SUM OF ALL RESPONSES TO PHQ9 QUESTIONS 1 & 2: 3
8. MOVING OR SPEAKING SO SLOWLY THAT OTHER PEOPLE COULD HAVE NOTICED. OR THE OPPOSITE, BEING SO FIGETY OR RESTLESS THAT YOU HAVE BEEN MOVING AROUND A LOT MORE THAN USUAL: 0
3. TROUBLE FALLING OR STAYING ASLEEP: 3
2. FEELING DOWN, DEPRESSED OR HOPELESS: 2
7. TROUBLE CONCENTRATING ON THINGS, SUCH AS READING THE NEWSPAPER OR WATCHING TELEVISION: 2
4. FEELING TIRED OR HAVING LITTLE ENERGY: 2

## 2018-08-17 NOTE — TELEPHONE ENCOUNTER
Pt has stated she has had blackouts and passes out time to time. Would Like order to Neuro and does not matter what place it is.  Please advise to office staff

## 2018-08-18 ASSESSMENT — ENCOUNTER SYMPTOMS
GASTROINTESTINAL NEGATIVE: 1
ALLERGIC/IMMUNOLOGIC NEGATIVE: 1
BACK PAIN: 1
RESPIRATORY NEGATIVE: 1
EYES NEGATIVE: 1

## 2018-08-18 NOTE — PROGRESS NOTES
Subjective:      Patient ID: Tayla Weinberg is a 48 y.o. female. Hyperlipidemia   This is a chronic problem. The problem is controlled. Recent lipid tests were reviewed and are variable. Exacerbating diseases include obesity. Factors aggravating her hyperlipidemia include fatty foods. Associated symptoms include myalgias. Current antihyperlipidemic treatment includes statins. The current treatment provides moderate improvement of lipids. Risk factors for coronary artery disease include obesity, post-menopausal and a sedentary lifestyle. Review of Systems   Constitutional: Negative. HENT: Negative. Eyes: Negative. Respiratory: Negative. Cardiovascular: Negative. Gastrointestinal: Negative. Endocrine: Negative. Musculoskeletal: Positive for arthralgias, back pain and myalgias. Skin: Negative. Allergic/Immunologic: Negative. Neurological: Negative. Hematological: Negative. Psychiatric/Behavioral: Positive for sleep disturbance. Past family and social history unremarkable. Objective:   Physical Exam   Constitutional: She is oriented to person, place, and time. She appears well-developed and well-nourished. HENT:   Head: Normocephalic and atraumatic. Right Ear: External ear normal.   Left Ear: External ear normal.   Mouth/Throat: Oropharynx is clear and moist.   Eyes: Pupils are equal, round, and reactive to light. Conjunctivae and EOM are normal.   Neck: Normal range of motion. Neck supple. Cardiovascular: Normal rate, regular rhythm, normal heart sounds and intact distal pulses. Pulmonary/Chest: Effort normal and breath sounds normal.   Abdominal: Soft. Bowel sounds are normal.   Genitourinary: Vagina normal and uterus normal.   Musculoskeletal: Normal range of motion. Degenerative polyarthralgia   Neurological: She is alert and oriented to person, place, and time. She has normal reflexes. Skin: Skin is warm and dry.    Psychiatric:   She looks anxious  His depression/suicidality   Vitals reviewed. Assessment:       Diagnosis Orders   1. Other hyperlipidemia     2. Lumbosacral radiculopathy at S1     3. Chronic pain syndrome     4. Vitamin D deficiency     5. Migraine without aura and without status migrainosus, not intractable     6. Post-menopausal     7. Fibromyalgia     8. Tobacco abuse     9. Tobacco abuse counseling     10. Hypothyroidism, unspecified type     11. Morbid obesity with BMI of 40.0-44.9, adult ([facility] [address]     12. Gastric bypass status for obesity             Plan:       78-year-old female returns for follow-up. She is afebrile hemodynamically stable, clinically unremarkable. History of morbid obesity with history of gastric bypass. However in response to unhealthy eating habits, sedentary lifestyle and stress she has started to regain significant amount of weight back. Prospective stratification is advised. She is advised to contact her bariatric service  History of chronic pain syndrome. Baseline stable. Fibromyalgia. She would benefit from sleep hygiene, daily moderate exercise and lifestyle change and reassurance. Discogenic disease of the cervical spine without any sign of myelopathy. Advised to refrain from judicious use of over-the-counter pain medication  She continued to smoke despite advise. Once again she is counseled, quit date, alternatives to consider. Underlying history of anxiety and depression with relationship issue. She denies suicidality. Advised to continue Cymbalta that she is tolerating well. I also suggested professional counseling  Asthma without any recent flare. Once again emphasized importance of smoke cessation. Continue current inhalers  History of migraine without aura. History of anxiety and depression with adjustment disorder. History of fibromyalgia. Risk factor stratification is advised. She would benefit from refraining from judicious use of over-the-counter pain medications.   Improved sleep hygiene, reassurance, maintain oral hydration. Low vitamin D. Continue replacement  GERD stable on H2 blocker  Hyperlipidemia on statin that she is tolerating well  Anemia of chronic disease. Patient denies epigastric symptoms or tarry stool. H&H is stable at baseline  Hypertension well controlled to beta blocker. Is advised to lose weight and consume less than 2 g of salt a day  Hypothyroidism on replacement. TSH is within normal limits  History of neurocardiogenic syncope. Continue beta blocker. No recent event. She denies excessive alcohol or illicit drug use  She is advised to update her Pap and breast exam with her GYN  Observe and call for any concern  This note is created with a voice recognition program and while intend to generate a document that accurately reflects the content of the visit, no guarantee can be provided that every mistake has been identified and corrected by editing.                 Ananda Rios MD

## 2018-08-22 ENCOUNTER — OFFICE VISIT (OUTPATIENT)
Dept: NEUROLOGY | Age: 53
End: 2018-08-22
Payer: COMMERCIAL

## 2018-08-22 VITALS
BODY MASS INDEX: 37.54 KG/M2 | DIASTOLIC BLOOD PRESSURE: 88 MMHG | SYSTOLIC BLOOD PRESSURE: 133 MMHG | HEART RATE: 63 BPM | HEIGHT: 62 IN | WEIGHT: 204 LBS

## 2018-08-22 DIAGNOSIS — R42 DIZZINESS: Primary | ICD-10-CM

## 2018-08-22 PROCEDURE — 4004F PT TOBACCO SCREEN RCVD TLK: CPT | Performed by: PSYCHIATRY & NEUROLOGY

## 2018-08-22 PROCEDURE — G8417 CALC BMI ABV UP PARAM F/U: HCPCS | Performed by: PSYCHIATRY & NEUROLOGY

## 2018-08-22 PROCEDURE — 3017F COLORECTAL CA SCREEN DOC REV: CPT | Performed by: PSYCHIATRY & NEUROLOGY

## 2018-08-22 PROCEDURE — 99204 OFFICE O/P NEW MOD 45 MIN: CPT | Performed by: PSYCHIATRY & NEUROLOGY

## 2018-08-22 PROCEDURE — G8427 DOCREV CUR MEDS BY ELIG CLIN: HCPCS | Performed by: PSYCHIATRY & NEUROLOGY

## 2018-08-22 RX ORDER — SYRINGE W-NEEDLE,DISPOSAB,3 ML 25GX5/8"
SYRINGE, EMPTY DISPOSABLE MISCELLANEOUS
Qty: 16 EACH | Refills: 0 | Status: SHIPPED | OUTPATIENT
Start: 2018-08-22

## 2018-08-22 RX ORDER — CYANOCOBALAMIN 1000 UG/ML
INJECTION INTRAMUSCULAR; SUBCUTANEOUS
Qty: 16 ML | Refills: 0 | Status: SHIPPED | OUTPATIENT
Start: 2018-08-22 | End: 2018-08-22 | Stop reason: SDUPTHER

## 2018-08-22 RX ORDER — FLUDROCORTISONE ACETATE 0.1 MG/1
0.1 TABLET ORAL DAILY
Qty: 30 TABLET | Refills: 3 | Status: SHIPPED | OUTPATIENT
Start: 2018-08-22 | End: 2018-09-21

## 2018-08-22 NOTE — PROGRESS NOTES
Results   Component Value Date    [id number] 05/20/2016      Neurological work up:  CT head  CTA head and neck  MRI brain   2 D echo                                 REVIEW OF SYSTEMS    Constitutional Weight: present, Appetite: absent, Fatigue: present   HEENT Ears:ringing, Eyes: glasses, Visual disturbance: present   Reespiratory Shortness of breath: absent, Cough: absent   Cardivascular Chest pain: absent, Leg swelling :absent   GI Constipation: absent, Diarrhea: absent, Swallowing change: absent    Urinary frequency: absent, Urinary urgency: present, Urinary incontinence: absent   Musculoskeletal Neck pain: present, Back pain: present, Stiffness: present, Muscle pain: present, Joint pain: present   Dermatological Hair loss: present, Skin changes: absent   Neurological Memory loss: present, Confusion: present, Seizures: absent, Trouble walking or imbalance: present, Dizziness: present, Weakness: present, Numbness: present Tremor: absent, Spasm: present, Speech difficulty: absent, Headache: present, Light sensitivity: present   Psychiatric Anxiety: absent, Hallucination: absent, Mood disorder: absent   Hematologic Abnormal bleeding: absent, Anemia: present, Clotting disorder: absent, Lymph gland changes: absent       General examination:    Head: Normocephalic, atraumatic  Eyes: Extraocular movements intact  Lungs: Respirations unlabored, chest wall no deformity  ENT: Normal external ear canals, no sinus tenderness  Heart: Regular rate rhythm  Abdomen: No masses, tenderness  Extremities: No cyanosis or edema, 2+ pulses  Skin: Intact, normal skin color    Neurological examination:    Mental status   Alert and oriented; intact memory with no confusion, speech or language problems; no hallucinations or delusions     Cranial nerves   II - visual fields intact to confrontation                                                III, IV, VI  extra-ocular muscles full: no pupillary defect; no CLAU, no nystagmus, no ptosis   V - normal facial sensation                                                               VII - normal facial symmetry                                                             VIII - intact hearing                                                                             IX, X - symmetrical palate                                                                  XI - symmetrical shoulder shrug                                                       XII - midline tongue without atrophy or fasciculation     Motor function  Normal muscle bulk and tone; normal power 5/5, including fine motor movements     Sensory function Intact to touch, pin, vibration, proprioception     Cerebellar Intact fine motor movement. No involuntary movements or tremors     Reflex function Intact 2+ DTR and symmetric. Negative Babinski     Gait                  Normal station and gait       Assessment Recommendations:  Symptoms are suggestive of orthostatic hypotension. Patient's tilt table test has been positive. To rule out any structural brain abnormality, I would obtain MRI scan of the brain with and without contrast.  For symptomatic improvement, I would initiate her on Florinef 0.1 mg by mouth daily. The patient has a low level of vitamin B12. She has a history of gastric bypass surgery. I would initiate her on B12 intramuscular injections 1000 MCG daily for 7 days then every month  Medication compliance, fall precautions were discussed. Questions were answered. Patient to follow up with me in next 4-6 weeks. Barb Fox MD , I would like to thank you for the consult. Please feel free to contact me if there remains any further question about the patient care. Josesito Woodson M.D.           Neurology        This note is created with the assistance of a speech-recognition program. While intending to generate a document that actually reflects the content of the visit, the document can still have

## 2018-08-23 RX ORDER — CYANOCOBALAMIN 1000 UG/ML
INJECTION INTRAMUSCULAR; SUBCUTANEOUS
Qty: 16 ML | Refills: 0 | OUTPATIENT
Start: 2018-08-23

## 2018-10-04 ENCOUNTER — HOSPITAL ENCOUNTER (OUTPATIENT)
Dept: MRI IMAGING | Facility: CLINIC | Age: 53
Discharge: HOME | End: 2018-10-06
Payer: COMMERCIAL

## 2018-10-04 ENCOUNTER — HOSPITAL ENCOUNTER (OUTPATIENT)
Age: 53
Setting detail: SPECIMEN
Discharge: HOME | End: 2018-10-04
Payer: COMMERCIAL

## 2018-10-04 DIAGNOSIS — R42 DIZZINESS: ICD-10-CM

## 2018-10-04 LAB — POC CREATININE: 0.6 MG/DL (ref 0.6–1.4)

## 2018-10-04 PROCEDURE — 6360000004 HC RX CONTRAST MEDICATION: Performed by: PSYCHIATRY & NEUROLOGY

## 2018-10-04 PROCEDURE — 70553 MRI BRAIN STEM W/O & W/DYE: CPT

## 2018-10-04 PROCEDURE — A9579 GAD-BASE MR CONTRAST NOS,1ML: HCPCS | Performed by: PSYCHIATRY & NEUROLOGY

## 2018-10-04 PROCEDURE — 2580000003 HC RX 258: Performed by: PSYCHIATRY & NEUROLOGY

## 2018-10-04 RX ORDER — SODIUM CHLORIDE 0.9 % (FLUSH) 0.9 %
10 SYRINGE (ML) INJECTION PRN
Status: DISCONTINUED | OUTPATIENT
Start: 2018-10-04 | End: 2018-10-07 | Stop reason: HOSPADM

## 2018-10-04 RX ADMIN — GADOTERIDOL 19 ML: 279.3 INJECTION, SOLUTION INTRAVENOUS at 14:57

## 2018-10-04 RX ADMIN — Medication 10 ML: at 14:57

## 2018-12-10 DIAGNOSIS — E55.9 VITAMIN D DEFICIENCY: ICD-10-CM

## 2018-12-10 NOTE — TELEPHONE ENCOUNTER
Last visit: 8/17/18    Next Visit Date:  No future appointments.     Health Maintenance   Topic Date Due    Shingles Vaccine (1 of 2 - 2 Dose Series) 02/04/2015    Flu vaccine (1) 09/01/2018    DTaP/Tdap/Td vaccine (1 - Tdap) 01/07/2019 (Originally 2/4/1984)    Cervical cancer screen  01/22/2019 (Originally 5/14/2014)    Breast cancer screen  01/15/2019    Colon cancer screen colonoscopy  09/06/2021    Lipid screen  07/10/2023    Pneumococcal med risk  Completed    Hepatitis C screen  Completed    HIV screen  Completed       Hemoglobin A1C (%)   Date Value   07/10/2018 5.1   12/16/2017 5.7   05/20/2016 5.4             ( goal A1C is < 7)   No results found for: LABMICR  LDL Cholesterol (mg/dL)   Date Value   07/10/2018 180 (H)   12/16/2017 133 (H)     LDL Calculated (mg/dL)   Date Value   07/16/2013 109       (goal LDL is <100)   AST (U/L)   Date Value   07/10/2018 16     ALT (U/L)   Date Value   07/10/2018 9     BUN (mg/dL)   Date Value   07/10/2018 3 (L)     BP Readings from Last 3 Encounters:   08/22/18 133/88   08/17/18 130/80   12/21/17 136/87          (goal 120/80)    All Future Testing planned in [facility]  Lab Frequency Next Occurrence   NORMA DIGITAL SCREEN W OR WO CAD BILATERAL Once 12/08/2017   PT aquatic therapy Once 12/21/2017               Patient Active Problem List:     Irregular bleeding     Menorrhagia     Lumbosacral radiculopathy at S1     Asthma     Vitamin D deficiency     Migraines     Fibromyalgia     Sacroiliitis (HCC)     Neurocardiogenic syncope     Gastric bypass status for obesity

## 2018-12-11 RX ORDER — ERGOCALCIFEROL 1.25 MG/1
CAPSULE ORAL
Qty: 30 CAPSULE | Refills: 0 | Status: SHIPPED | OUTPATIENT
Start: 2018-12-11

## 2019-05-29 RX ORDER — LEVOTHYROXINE SODIUM 175 UG/1
TABLET ORAL
Qty: 30 TABLET | Refills: 0 | OUTPATIENT
Start: 2019-05-29

## 2019-05-30 RX ORDER — LEVOTHYROXINE SODIUM 175 UG/1
TABLET ORAL
Qty: 90 TABLET | Refills: 0 | OUTPATIENT
Start: 2019-05-30

## 2020-01-30 RX ORDER — ATORVASTATIN CALCIUM 40 MG/1
TABLET, FILM COATED ORAL
Qty: 30 TABLET | Refills: 0 | OUTPATIENT
Start: 2020-01-30

## 2021-08-18 LAB — MAMMOGRAPHY, EXTERNAL: NORMAL

## 2022-04-18 ENCOUNTER — HOSPITAL ENCOUNTER (OUTPATIENT)
Dept: PHYSICAL THERAPY | Facility: CLINIC | Age: 57
Setting detail: THERAPIES SERIES
Discharge: HOME | End: 2022-04-18
Payer: MEDICARE

## 2022-04-18 PROCEDURE — 97162 PT EVAL MOD COMPLEX 30 MIN: CPT

## 2022-04-18 NOTE — CONSULTS
[] [facility] [address]  [address]  [phone]  F: [phone] [] [address]  [facility]   [address]  P: [phone]  F: [phone] [] [address]  [facility]  [address]  P: [phone]  F: [phone] [] [address]  [facility]   [address]   [place] Franc: [phone]  F: [phone]  [x] [address]  P: [phone]  F: [phone]      Physical Therapy Spine Evaluation    Date:  2022  Patient: Anali Ruiz  :  1965  MRN: [id number]  Physician: Skinny Purvis CNP   Insurance: [employer] ([employer] AFTER EVAL: Auth approved for 6 visits until )  Medical Diagnosis: Lumbar radicular pain  Rehab Codes: M54.16  Onset date: ~21  Next Dr's appt.:   Auth approved for 6 visits only    Subjective:   CC: Pt realates chronic back pain for years without any traumatic onset. Recently has been noticing pain increasing with muscle spasms along with back \"giving out\". Does take muscle relaxors and tylenol with mild relief. Pt stats to back \"locking up\" three times in the past year where muscles spasm and have difficulty walking. Pt has had a few instances of shooting pain into LLE distally to mid calf. HPI: PT did have bariatric 10 years ago and lost almost 200 pounds.     PMHx: [] Unremarkable [] Diabetes [] HTN  [] Pacemaker   [] MI/Heart Problems [] Cancer [] Arthritis [] Other:              [x] Refer to full medical chart  In EPIC         Tests: [] X-Ray: [] MRI:  [] Other:    Medications: [x] Refer to full medical record [] None [] Other:  Allergies:      [x] Refer to full medical record [] None [] Other:        Marital Status Lives alone   Home type Apt   Stairs from outside 14 steps Hand rail R side    Stairs inside            Hand rail    Employment Disability    Job status    Work Activities/duties     Recreational Activities Singing, spending time with family (grandkids)     ADL/IADL Previous level of function Current level of function Who currently assists the patient with task   Bathing  [x] Independent  [] Assist [x] Independent  [] Assist    Dress/grooming [x] Independent  [] Assist [x] Independent  [] Assist    Transfer/mobility [x] Independent  [] Assist [x] Independent  [] Assist    Feeding [x] Independent  [] Assist [x] Independent  [] Assist    Toileting [x] Independent  [] Assist [x] Independent  [] Assist    Driving [x] Independent  [] Assist [x] Independent  [] Assist    Housekeeping [x] Independent  [] Assist [x] Independent  [] Assist Dtr assists with bringing laundry basket upstairs    Grocery shop/meal prep [x] Independent  [] Assist [x] Independent  [] Assist      Gait Prior level of function Current level of function    [x] Independent  [] Assist [x] Independent  [] Assist   Device: [] Independent [] Independent    [] Straight Cane [] Quad cane [] Straight Cane [x] Quad cane    [] Standard walker [] Rolling walker   [] 4 wheeled walker [] Standard walker [] Rolling walker   [] 4 wheeled walker    [] Wheelchair [] Wheelchair   Pt has quad cane that uses only if BLE feel weak or back pain increases. Pain present? Yes   Location Lower back    Pain Rating currently 5-6/10   Pain at worse 9/10   Pain at best 5/10   Description of pain Constant ache   Altered Sensation Denies   What makes it worse Movement    What makes it better Rest    Symptom progression Worsening    Sleep Able to sleep through the night on most night however is constantly repositioning              Objective:   STRENGTH  ROM    Left Right Cervical    L1-2 Hip Flex 4+/5 4/5 Flexion    Hip Abd 3/5 3-/5 Extension    L3-4 Knee Ext   Rotation L R   L4 Ankle DF   Sidebend L R   L5 EHL   Retraction    S1 Plant. Flex   Lumbar    Abdominals   Flexion Limited 25% pain   Erector Spinae   Extension Limited 50% pain      Rotation L Limited 25% pain R Limited 25% pain      Sidebend L Limited 25% R Limited 25% pain     TESTS (+/-) LEFT RIGHT Not Tested   SLR [] sit [] supine   []   Hamstring (SLR)   []   SKTC   []   DKTC   []   Slump/Dural   []   SI JT   []   MEL   []   Joint Mobility   []   Cerv. Comp   []   Cerv. Distraction   []   Cerv. Alar/Transverse   []   Vertebral Artery   []   Adsons   []   Anna Demark   []   Mary Tests ? Pain ? Pain No Change Not Tested   RFIS [] [] [x] []   YUE [] [] [x] []   RFIL [] [] [x] []   REIL [] [] [x] []   Rep Prot [] [] [x] []   Rep Retract [] [] [x] []       OBSERVATION No Deficit Deficit Not Tested Comments   Posture       Forward Head [] [x] []    Rounded Shoulders [] [] []    Kyphosis [] [] []    Lordosis [] [] []    Leg Length Discrp [] [] []    Slumped Sitting [] [] []    Palpation [] [x] [] TTP bilateral lumbar paraspinals   Sensation [] [] []    Edema [] [] []    Neurological [] [] []                Functional Test: Oswestry  Score: 54% functionally impaired         Assessment:   Patient presents with signs and symptoms consistent with chronic low back pain exacerbated by significant hip weakness after bariatric surgery. Patient would benefit from skilled physical therapy services in order to: Decrease tightness in bilateral lumbar paraspinals, increase strength in bilateral hip abductors. Patient will also benefit from further PNE discussion d/t pt's fibromylagia and chronic nature of pain; small discussion held regarding integrating small easy movements to decrease body's overall sensitivity. Goals:        STG: (to be met in 6 treatments)  1. ? Pain: Decrease pain levels to 2/10  2. ? ROM: Increase flexibility and AROM limitations throughout to equal bilat to reduce difficulty with ADLs full lumbar AROM painfree  3. ? Strength:  Increase bilateral hip abd strength to 4/5  4. ? Function: Pt to report walking 1 mile without an increase in back pain. 5. Independent with Home Exercise Programs      LTG: (to be met in 20 treatments)  1. Improve score on assessment tool from 54% impaired to 25% impaired, demonstrating an improvement in ADLs  2. Reduce pain levels to 0/10                   Patient goals: To walk > one mile     Rehab Potential:  [x] Good  [] Fair  [] Poor   Suggested Professional Referral:  [x] No  [] Yes:  Barriers to Goal Achievement[de-identified]  [x] No  [] Yes:  Domestic Concerns:  [x] No  [] Yes:    Pt. Education:  [x] Plans/Goals, Risks/Benefits discussed  [x] Home exercise program    Method of Education: [x] Verbal  [x] Demo  [x] Written  Comprehension of Education:  [x] Verbalizes understanding. [x] Demonstrates understanding. [x] Needs Review. [] Demonstrates/verbalizes understanding of HEP/Ed previously given. Treatment Plan:  [x] Therapeutic Exercise    [] Aquatic Therapy   [x] Manual Therapy     [x] Electrical Stimulation  [x] Instruction in HEP      [] Lumbar/Cervical Traction  [x] Neuromuscular Re-education [x] Cold/hotpack  [] Iontophoresis: 4 mg/mL  [] Vasocompression ([employer])                    Dexamethasone Sodium  [] Gait Training             Phosphate 40-80 mAmin  [x] Integrative Dry Needling         []  Medication allergies reviewed for use of    Dexamethasone Sodium Phosphate 4mg/ml     with iontophoresis treatments. Pt is not allergic.     Frequency:  2 x/week for 20 visits    Todays Treatment:    Exercises:  Exercise  Chronic low back pain   Reps/ Time Weight/ Level Comments         NUSTEP            Clamshells 2x10     Bridges      Standing hip abd, ext                                                            Other:    Specific Instructions for next treatment: Trial hypervolt to bilateral lumbar paraspinals, progress hip abd strengthening     Evaluation Complexity:  History (Personal factors, comorbidities) [] 0 [x] 1-2 [] 3+   Exam (limitations, restrictions) [] 1-2 [x] 3 [] 4+   Clinical presentation (progression) [] Stable [x] Evolving  [] Unstable   Decision Making [] Low [x] Moderate [] High    [] Low Complexity [x] Moderate Complexity [] High Complexity       Treatment Charges: Mins Units   [x] Evaluation       [x]  Low       []  Moderate       []  High 40 1   []  Modalities     [x]  Ther Exercise 5 0   []  Manual Therapy     []  Ther Activities     []  Aquatics     []  Vasocompression     []  Other       TOTAL TREATMENT TIME: 45 minutes    Time in: 1415     Time Out: 1500    Electronically signed by: Clary Bowden PT        Physician Signature:________________________________Date:__________________  By signing above or cosigning this note, I have reviewed this plan of care and certify a need for medically necessary rehabilitation services.      *PLEASE SIGN ABOVE AND FAX BACK ALL PAGES*

## 2022-04-18 NOTE — PRE-CERTIFICATION NOTE
Medicare Cap   [x] Physical Therapy  [] Speech Therapy  [] Occupational therapy  *PT and Speech caps combine      $2150 Limit for PT and Speech combined  $2150 Limit for OT individually  At the beginning of the month where you expect to go over $2150, please add the [address] [place] [address] modifier      Patient Name: Arline Gregorio  YOB: 1965    Note:  This is an estimate of charges billed.      Date of [address] Name # units/ charge $$$ charge Daily Total Charge Ongoing Total $$$   4/18 PT eval 98.01 98.01 98.01 98.01

## 2022-04-25 ENCOUNTER — HOSPITAL ENCOUNTER (OUTPATIENT)
Dept: PHYSICAL THERAPY | Facility: CLINIC | Age: 57
Setting detail: THERAPIES SERIES
Discharge: HOME | End: 2022-04-25
Payer: MEDICARE

## 2022-04-25 PROCEDURE — 97110 THERAPEUTIC EXERCISES: CPT

## 2022-04-25 NOTE — PRE-CERTIFICATION NOTE
Medicare Cap   [x] Physical Therapy  [] Speech Therapy  [] Occupational therapy  *PT and Speech caps combine      $2150 Limit for PT and Speech combined  $2150 Limit for OT individually  At the beginning of the month where you expect to go over $2150, please add the [address] [place] [address] modifier      Patient Name: Bethel Kaplan  YOB: 1965    Note:  This is an estimate of charges billed.      Date of [address] Name # units/ charge $$$ charge Daily Total Charge Ongoing Total $$$   4/18 PT eval 98.01 98.01 98.01 98.01   4/25 TE 4 29.21+22.84*3 97.53 195.54

## 2022-04-25 NOTE — FLOWSHEET NOTE
[] [address]  [address]  [phone]  F: [phone] [] [address]  [facility]   [address]  P: [phone]  F: [phone] [] [address]  [address]  P: [phone]  F: [phone] [x] [address]  P: [phone]  F: [phone] [] [address]  [facility]   [address]   [place]: [phone]  F: [phone]      Physical Therapy Daily Treatment Note    Date:  2022  Patient Name:  Faith Song    :  1965  MRN: [id number]  Physician: Haroldo Mancuso CNP                              Insurance: [employer] Medicare ([facility] AFTER EVAL: Auth approved for 6 visits until )  Medical Diagnosis: Lumbar radicular pain               Rehab Codes: M54.16  Onset date: ~21               Next Dr's appt. :   Visit# / total visits: ; (6 visits approved after eval)    Cancels/No Shows: 0/0    Subjective:    Pain:  [x] Yes  [] No Location: low back  Pain Rating: (0-10 scale) 7-8/10  Pain altered Tx:  [] No  [] Yes  Action:  Comments: Pt stated that she was in a high level of pain today, stating she did not much sleep last night. Pain located in bilateral hips and low back.      Objective:  Modalities:   Precautions:  Exercises: Give HEP  Exercise  Chronic low back pain    Reps/ Time Weight/ Level Comments             NUSTEP 5 min Level 2                 Side Lying      Hip abduction         Clamshells 2x10             Supine      Bridges  2x10       Glute squeeze 10x3\"     Everett stretch 2x30\"     Piriformis stretch 3x10\"     Figure 4 stretch held     PPT 2x10 3\"           Gym      Standing hip abd, ext 10x ea        calf stretch 2x30\"        Total Gym 15x Level 16     Other:      Treatment Charges: Mins Units   []  Modalities     [x]  Ther Exercise 55 4   []  Manual Therapy     []  Ther Activities     []  Aquatics     []  Vasocompression     []  Other     Total Treatment time 55 4       Assessment: [x] Progressing toward goals. Added low back and LE strengthening activities to achieve goals. Pt demonstrated increased pain and decreased strength through out treatment but was able to complete all reps as noted above. Pt experienced increased hip pain during figure 4 stretch so activity was held. [] No change. [] Other:  [x] Patient would continue to benefit from skilled physical therapy services in order to: Decrease tightness in bilateral lumbar paraspinals, increase strength in bilateral hip abductors. Patient will also benefit from further PNE discussion d/t pt's fibromylagia and chronic nature of pain; small discussion held regarding integrating small easy movements to decrease body's overall sensitivity. STG/LTG  STG: (to be met in 6 treatments)  1. ? Pain: Decrease pain levels to 2/10  2. ? ROM: Increase flexibility and AROM limitations throughout to equal bilat to reduce difficulty with ADLs full lumbar AROM painfree  3. ? Strength: Increase bilateral hip abd strength to 4/5  4. ? Function: Pt to report walking 1 mile without an increase in back pain. 5. Independent with Home Exercise Programs        LTG: (to be met in 20 treatments)  1. Improve score on assessment tool from 54% impaired to 25% impaired, demonstrating an improvement in ADLs  2. Reduce pain levels to 0/10                    Patient goals: To walk > one mile       Pt. Education:  [x] Yes  [] No  [x] Reviewed Prior HEP/Ed  Method of Education: [x] Verbal  [] Demo  [x] Written  Comprehension of Education:  [x] Verbalizes understanding. [x] Demonstrates understanding. [] Needs review. [x] Demonstrates/verbalizes HEP/Ed previously given. Plan: [x] Continue current frequency toward long and short term goals.     [x] Specific Instructions for subsequent treatments: increase low back and hip strength as tolerated.        Time In: 1300            Time Out: 1400    Electronically signed by:  Yodit Still PTA

## 2022-04-27 ENCOUNTER — HOSPITAL ENCOUNTER (OUTPATIENT)
Dept: PHYSICAL THERAPY | Facility: CLINIC | Age: 57
Setting detail: THERAPIES SERIES
Discharge: HOME | End: 2022-04-27
Payer: MEDICARE

## 2022-04-27 PROCEDURE — 97110 THERAPEUTIC EXERCISES: CPT

## 2022-04-27 NOTE — PRE-CERTIFICATION NOTE
Medicare Cap   [x] Physical Therapy  [] Speech Therapy  [] Occupational therapy  *PT and Speech caps combine      $2150 Limit for PT and Speech combined  $2150 Limit for OT individually  At the beginning of the month where you expect to go over $2150, please add the [address] [place] [address] modifier      Patient Name: Hien Herrera  YOB: 1965    Note:  This is an estimate of charges billed.      Date of [address] Name # units/ charge $$$ charge Daily Total Charge Ongoing Total $$$   4/18 PT eval 98.01 98.01 98.01 98.01   4/25 TE 4 29.21+22.84*3 97.53 195.54   4/27 Therex 3 29.21+22.84+22.84 74.89 270.43

## 2022-04-27 NOTE — FLOWSHEET NOTE
[] [address]  [address]  [phone]  F: [phone] [] [address]  [facility]   [address]  P: [phone]  F: [phone] [] [address]  [address]  P: [phone]  F: [phone] [x] [address]  P: [phone]  F: [phone] [] [address]  [facility]   [address]   [place]: [phone]  F: [phone]      Physical Therapy Daily Treatment Note    Date:  2022  Patient Name:  Bethel Kaplan    :  1965  MRN: [id number]  Physician: Shira Segal CNP                              Insurance: [employer] Medicare ([facility]: Auth approved for 6 visits until )  Medical Diagnosis: Lumbar radicular pain               Rehab Codes: M54.16  Onset date: ~21               Next Dr's appt. :   Visit# / total visits: 3/6; (6 visits approved after eval)    Cancels/No Shows: 0/0    Subjective:    Pain:  [x] Yes  [] No Location: low back  Pain Ratin /10  Pain altered Tx:  [] No  [] Yes  Action:  Comments: Pt reports soreness after last visit two days ago but pain has since decreased.     Objective:  Modalities:   Precautions:  Exercises: Give HEP  Exercise  Chronic low back pain    Reps/ Time Weight/ Level Comments             NUSTEP 5 min Level 2                 Side Lying      Hip abduction         Clamshells 2x10    3 way         Supine      Bridges  2x10       Glute squeeze 10x3\"     Everett stretch 2x30\"     Piriformis stretch 3x10\"  With towel    Figure 4 stretch 2x30\"     PPT 2x10 3\"     SAQ 2x10           Gym      Standing hip abd, ext 2x10x ea        calf stretch 2x30\"        Total Gym 2x10 Level 16     Other:      Treatment Charges: Mins Units   []  Modalities     [x] Ther Exercise 45 3   []  Manual Therapy     []  Ther Activities     []  Aquatics     []  Vasocompression     []  Other     Total Treatment time 45 3       Assessment: [x] Progressing toward goals. Initiated on Susa Gram followed by continuation of standing exercises; pt completed all without pain. Continued onto supine mat exercises, attempted to progress to SLR to strengthen RLE however too painful in anterior hip. Performed SAQ instead of SLR which pt was able to tolerate well, noting quad soreness but denying pain. [] No change. [] Other:  [x] Patient would continue to benefit from skilled physical therapy services in order to: Decrease tightness in bilateral lumbar paraspinals, increase strength in bilateral hip abductors. Patient will also benefit from further PNE discussion d/t pt's fibromylagia and chronic nature of pain; small discussion held regarding integrating small easy movements to decrease body's overall sensitivity. STG/LTG  STG: (to be met in 6 treatments)  1. ? Pain: Decrease pain levels to 2/10  2. ? ROM: Increase flexibility and AROM limitations throughout to equal bilat to reduce difficulty with ADLs full lumbar AROM painfree  3. ? Strength: Increase bilateral hip abd strength to 4/5  4. ? Function: Pt to report walking 1 mile without an increase in back pain. 5. Independent with Home Exercise Programs        LTG: (to be met in 20 treatments)  1. Improve score on assessment tool from 54% impaired to 25% impaired, demonstrating an improvement in ADLs  2. Reduce pain levels to 0/10                    Patient goals: To walk > one mile       Pt. Education:  [x] Yes  [] No  [x] Reviewed Prior HEP/Ed  Method of Education: [x] Verbal  [] Demo  [x] Written  Comprehension of Education:  [x] Verbalizes understanding. [x] Demonstrates understanding. [] Needs review. [x] Demonstrates/verbalizes HEP/Ed previously given. Plan: [x] Continue current frequency toward long and short term goals. [x] Specific Instructions for subsequent treatments: increase low back and hip strength as tolerated.        Time In: 1300            Time Out: 150    Electronically signed by:  Breanna Rodriges

## 2022-05-03 ENCOUNTER — HOSPITAL ENCOUNTER (OUTPATIENT)
Dept: PHYSICAL THERAPY | Facility: CLINIC | Age: 57
Setting detail: THERAPIES SERIES
Discharge: HOME | End: 2022-05-03
Payer: MEDICARE

## 2022-05-03 PROCEDURE — 97110 THERAPEUTIC EXERCISES: CPT

## 2022-05-03 NOTE — FLOWSHEET NOTE
[] [facility]  [address]  [phone]  F: [phone] [] [address]  [place] [facility]   [address]  P: [phone]  F: [phone] [] [address]  [address]  P: [phone]  F: [phone] [x] [address]  P: [phone]  F: [phone] [] [address]  [facility]   [address]   [place]: [phone]  F: [phone]      Physical Therapy Daily Treatment Note    Date:  5/3/2022  Patient Name:  Kye Landers    :  1965  MRN: [id number]  Physician: Grzegorz Tuttle CNP                              Insurance: [id number] [place] [address] Medicare ([employer] AFTER EVAL: Auth approved for 6 visits until )  Medical Diagnosis: Lumbar radicular pain               Rehab Codes: M54.16  Onset date: ~21               Next Dr's appt. :   Visit# / total visits: 3/6; (6 visits approved after eval)    Cancels/No Shows: 0/0    Subjective:    Pain:  [x] Yes  [] No Location: low back  Pain Ratin /10  Pain altered Tx:  [] No  [] Yes  Action:  Comments: Pt reports soreness in both quads, with more pain noted on the right side. Pain can reach up to an 8/10, specifically with hip flexion.     Objective:  Modalities:   Precautions:  Exercises: Give HEP  Exercise  Chronic low back pain    Reps/ Time Weight/ Level Comments             NUSTEP 5 min Level 2                 Side Lying      Hip abduction      Held 5/3   Clamshells 2x10    3 way         Supine      Bridges  2x10       Glute squeeze 2x10 3\"     Everett stretch 2x30\"  Held 5/3   Piriformis stretch 3x10\"  With towel ; caused increased pain in anterior hip/quad 5/3   Figure 4 stretch 2x30\"     PPT 2x10 3\"     PPT w/ marching 2x10     SAQ 2x10           Gym      Standing hip abd, ext 2x10x ea       Step-up's 2x10 ea 4\" step     calf stretch 2x30\"        Total Gym 2x10 Level 16     Other:      Treatment Charges: Mins Units   []  Modalities     [x]  Ther Exercise 55 4   []  Manual Therapy     []  Ther Activities     []  Aquatics     []  Vasocompression     []  Other     Total Treatment time 55 4       Assessment: [x] Progressing toward goals. Began treatment on NUSTEP, followed by standing exercises listed above. Standing exercises were progressed by adding in step-ups, first attempted 6\" but reduced to 4\" due to increase in pain. Pt then continued to supine exercises listed above. A significant increase in anterior hip and quad pain was noted with piriformis stretch, bringing pain to an 8/10. PPT exercise was progressed by adding in marches, which pt performed with minimal pain. Remainder of exercises were performed with good tolerance. MHP was offered but pt said she was okay without for today. [] No change. [] Other:  [x] Patient would continue to benefit from skilled physical therapy services in order to: Decrease tightness in bilateral lumbar paraspinals, increase strength in bilateral hip abductors. Patient will also benefit from further PNE discussion d/t pt's fibromylagia and chronic nature of pain; small discussion held regarding integrating small easy movements to decrease body's overall sensitivity. STG/LTG  STG: (to be met in 6 treatments)  1. ? Pain: Decrease pain levels to 2/10  2. ? ROM: Increase flexibility and AROM limitations throughout to equal bilat to reduce difficulty with ADLs full lumbar AROM painfree  3. ? Strength: Increase bilateral hip abd strength to 4/5  4. ? Function: Pt to report walking 1 mile without an increase in back pain. 5. Independent with Home Exercise Programs        LTG: (to be met in 20 treatments)  1.  Improve score on assessment tool from 54% impaired to 25% impaired, demonstrating an improvement in ADLs  2. Reduce pain levels to 0/10                    Patient goals: To walk > one mile       Pt. Education:  [x] Yes  [] No  [x] Reviewed Prior HEP/Ed  Method of Education: [x] Verbal  [] Demo  [x] Written  Comprehension of Education:  [x] Verbalizes understanding. [x] Demonstrates understanding. [] Needs review. [x] Demonstrates/verbalizes HEP/Ed previously given. Plan: [x] Continue current frequency toward long and short term goals. [x] Specific Instructions for subsequent treatments: increase low back and hip strength as tolerated. Time In: 100            Time Out: 200    Electronically signed by:  ADNIKA Gao Evaluation/treatment performed by Student PT under the supervision of [facility] MY[facility] who agrees with all evaluation/treatment and documentation.

## 2022-05-05 ENCOUNTER — APPOINTMENT (OUTPATIENT)
Dept: PHYSICAL THERAPY | Facility: CLINIC | Age: 57
End: 2022-05-05
Payer: MEDICARE

## 2022-05-10 ENCOUNTER — HOSPITAL ENCOUNTER (OUTPATIENT)
Dept: PHYSICAL THERAPY | Facility: CLINIC | Age: 57
Setting detail: THERAPIES SERIES
Discharge: HOME | End: 2022-05-10
Payer: MEDICARE

## 2022-05-10 PROCEDURE — 97110 THERAPEUTIC EXERCISES: CPT

## 2022-05-10 NOTE — FLOWSHEET NOTE
[] [facility] [address]  [address]  [phone]  F: [phone] [] [address]  [facility]   [address]  P: [phone]  F: [phone] [] [address]  [address] [facility] [address]  P: [phone]  F: [phone] [x] [address]  P: [phone]  F: [phone] [] [address]  [facility]   [address]   [place]: [phone]  F: [phone]      Physical Therapy Daily Treatment Note    Date:  5/10/2022  Patient Name:  Ivania Dumas    :  1965  MRN: [id number]  Physician: Renetta Osullivan CNP                              Insurance: [employer] ([facility]: Auth approved for 6 visits until )  Medical Diagnosis: Lumbar radicular pain               Rehab Codes: M54.16  Onset date: ~21               Next Dr's appt. :   Visit# / total visits: ; (6 visits approved after eval)    Cancels/No Shows: 0/0    Subjective:    Pain:  [x] Yes  [] No Location: low back  Pain Ratin /10  Pain altered Tx:  [] No  [] Yes  Action:  Comments: Pt reports an increase in pain today, ranging from a 7 to an 8 \"depending on where\"    Objective:  Modalities:   Precautions:  Exercises: Give HEP  Exercise  Chronic low back pain    Reps/ Time Weight/ Level Comments               NUSTEP 5 min Level 2   x                 Side Lying       Hip abduction      Held 5/10    Clamshells 2x10    3 way x          Supine       Bridges  2x10     x   Glute squeeze 2x10 3\"   x   Rylee Shelia stretch 2x30\"  Held 5/10    Piriformis stretch 3x10\"  With towel ; caused increased pain in anterior hip/quad 5/10- held R side x   Figure 4 stretch 2x30\"   x   PPT 2x10 3\"   x   PPT w/ marching 2x10   x   SAQ 2x10 1#  x          Gym       Standing hip abd, ext 2x10x ea     x   Step-up's 2x10 ea 4\" step      calf stretch 2x30\"         Total Gym 2x10 Level 16      Other:      Treatment Charges: Mins Units   []  Modalities     [x]  Ther Exercise 40 3   []  Manual Therapy     []  Ther Activities     []  Aquatics     []  Vasocompression     []  Other     Total Treatment time 40 3       Assessment: [x] Progressing toward goals. Began treatment on NUSTEP, followed by mat exercises listed above. R piriformis stretch was held due to a significant increase in anterior hip pain. SAQ was progressed by adding weight which pt performed with good tolerance. Step-up's and total gym were held due to increased pain upon arrival. Standing hip abduction and extension exercises were performed with good tolerance. [] No change. [] Other:  [x] Patient would continue to benefit from skilled physical therapy services in order to: Decrease tightness in bilateral lumbar paraspinals, increase strength in bilateral hip abductors. Patient will also benefit from further PNE discussion d/t pt's fibromylagia and chronic nature of pain; small discussion held regarding integrating small easy movements to decrease body's overall sensitivity. STG/LTG  STG: (to be met in 6 treatments)  1. ? Pain: Decrease pain levels to 2/10  2. ? ROM: Increase flexibility and AROM limitations throughout to equal bilat to reduce difficulty with ADLs full lumbar AROM painfree  3. ? Strength: Increase bilateral hip abd strength to 4/5  4. ? Function: Pt to report walking 1 mile without an increase in back pain. 5. Independent with Home Exercise Programs        LTG: (to be met in 20 treatments)  1. Improve score on assessment tool from 54% impaired to 25% impaired, demonstrating an improvement in ADLs  2. Reduce pain levels to 0/10                    Patient goals: To walk > one mile       Pt.  Education:  [x] Yes  [] No  [x] Reviewed Prior HEP/Ed  Method of Education: [x] Verbal  [] Demo  [x] Written  Comprehension of Education:  [x] Verbalizes understanding. [x] Demonstrates understanding. [] Needs review. [x] Demonstrates/verbalizes HEP/Ed previously given. Plan: [x] Continue current frequency toward long and short term goals. [x] Specific Instructions for subsequent treatments: increase low back and hip strength as tolerated. Time In: 100            Time Out: 200    Electronically signed by:  Maralee Litten, SPT Evaluation/treatment performed by Student PT under the supervision of [facility] who agrees with all evaluation/treatment and documentation.

## 2022-05-17 ENCOUNTER — HOSPITAL ENCOUNTER (OUTPATIENT)
Dept: PHYSICAL THERAPY | Facility: CLINIC | Age: 57
Setting detail: THERAPIES SERIES
Discharge: HOME | End: 2022-05-17
Payer: MEDICARE

## 2022-05-17 PROCEDURE — 97110 THERAPEUTIC EXERCISES: CPT

## 2022-05-17 NOTE — FLOWSHEET NOTE
[] [facility] [address]  [address]  [phone]  F: [phone] [] [address]  [place] [address]   [address]  P: [phone]  F: [phone] [] [address]  [address] [facility] [address]  P: [phone]  F: [phone] [x] [address]  P: [phone]  F: [phone] [] [address]  [facility]   [address]   [address]: [phone]  F: [phone]      Physical Therapy Daily Treatment Note    Date:  2022  Patient Name:  Vi Rosas    :  1965  MRN: [id number]  Physician: Dilcia Pettit CNP                              Insurance: [place] [employer] ([facility] AFTER EVAL: Auth approved for 6 visits until )  Medical Diagnosis: Lumbar radicular pain               Rehab Codes: M54.16  Onset date: ~21               Next 's appt. :   Visit# / total visits: ; (6 visits approved after eval)    Cancels/No Shows: 0/0    Subjective:    Pain:  [x] Yes  [] No Location: low back  Pain Ratin /10  Pain altered Tx:  [] No  [] Yes  Action:  Comments: Pt arrives stating her pain has decreased a bit since last visit and states she \"feels stronger\"      Objective:  Modalities:   Precautions:  Exercises: Give HEP  Exercise  Chronic low back pain    Reps/ Time Weight/ Level Comments               NUSTEP 5 min Level 2   x                 Side Lying       Hip abduction      Held  -   Clamshells 2x10    3 way x          Supine       Bridges  2x10     x   Glute squeeze 2x10 3\"   x   Everett stretch 2x30\"  Held 5/10 -   Piriformis stretch 3x10\"  With towel ; caused increased pain in anterior hip/quad 5/10- held R side -   Figure 4 stretch 2x30\"   x   Supine hip flexor stretch 3'  R side only x   PPT 2x10 3\"   x   PPT w/ marching 2x10  Held 5/17 d/t ant pain w/ hip flexion -   SAQ 2x10 1#  x          Gym       Standing hip abd, ext 2x10x ea     x   Step-up's 2x10 ea 4\" step  x    calf stretch 2x30\"     x    Total Gym 2x10 Level 16   x   Other:  Manual: Instrument assisted MFR via hypervolt to R hip flexor, inf distraction to R hip     Treatment Charges: Mins Units   []  Modalities     [x]  Ther Exercise 40 3   [x]  Manual Therapy 5 n/c   []  Ther Activities     []  Aquatics     []  Vasocompression     []  Other     Total Treatment time 45 3       Assessment: [x] Progressing toward goals. Began treatment on NUSTEP, followed by standing exercises as listed. Pt voiced a significant increase in R anterior hip pain as standing exercises continued, so manual was performed via Hypervolt to help relieve tension in hip flexor. Inferior distraction also performed, which pt noted decreased tightness in hip as well as pain. Mat exercises indicated above were continued, holding PPT w/ marching to avoid hip flexion. Pt tolerated remaining exercises well. [] No change. [] Other:  [x] Patient would continue to benefit from skilled physical therapy services in order to: Decrease tightness in bilateral lumbar paraspinals, increase strength in bilateral hip abductors. Patient will also benefit from further PNE discussion d/t pt's fibromylagia and chronic nature of pain; small discussion held regarding integrating small easy movements to decrease body's overall sensitivity. STG/LTG  STG: (to be met in 6 treatments)  1. ? Pain: Decrease pain levels to 2/10  2. ? ROM: Increase flexibility and AROM limitations throughout to equal bilat to reduce difficulty with ADLs full lumbar AROM painfree  3. ? Strength: Increase bilateral hip abd strength to 4/5  4. ? Function: Pt to report walking 1 mile without an increase in back pain. 5. Independent with Home Exercise Programs        LTG: (to be met in 20 treatments)  1.  Improve score on assessment tool from 54% impaired to 25% impaired, demonstrating an improvement in ADLs  2. Reduce pain levels to 0/10                    Patient goals: To walk > one mile       Pt. Education:  [x] Yes  [] No  [x] Reviewed Prior HEP/Ed  Method of Education: [x] Verbal  [] Demo  [x] Written  Comprehension of Education:  [x] Verbalizes understanding. [x] Demonstrates understanding. [] Needs review. [x] Demonstrates/verbalizes HEP/Ed previously given. Plan: [x] Continue current frequency toward long and short term goals. [x] Specific Instructions for subsequent treatments: increase low back and hip strength as tolerated. Time In: 12:55pm            Time Out: 1:40pm    Electronically signed by:  DANIKA Sarmiento Evaluation/treatment performed by Student PT under the supervision of [facility] who agrees with all evaluation/treatment and documentation.

## 2022-05-24 ENCOUNTER — HOSPITAL ENCOUNTER (OUTPATIENT)
Dept: PHYSICAL THERAPY | Facility: CLINIC | Age: 57
Setting detail: THERAPIES SERIES
Discharge: HOME | End: 2022-05-24
Payer: MEDICARE

## 2022-05-24 PROCEDURE — 97110 THERAPEUTIC EXERCISES: CPT

## 2022-05-24 NOTE — FLOWSHEET NOTE
[] [facility] [address]  [address]  [phone]  F: [phone] [] [address]  [place] [address]   [address]  P: [phone]  F: [phone] [] [address]  [address] [facility]  P: [phone]  F: [phone] [x] [address]  P: [phone]  F: [phone] [] [address]  [facility]   [address]   [place]: [phone]  F: [phone]      Physical Therapy Daily Treatment Note    Date:  2022  Patient Name:  Angela Mike    :  1965  MRN: [id number]  Physician: Desi Gregg CNP                              Insurance: [place] Medicare ([place] [id number] AFTER EVAL: Auth approved for 6 visits until )  Medical Diagnosis: Lumbar radicular pain               Rehab Codes: M54.16  Onset date: ~21               Next Dr's appt. :   Visit# / total visits: ; (6 visits approved after eval)    Cancels/No Shows: 0/0    Subjective:    Pain:  [x] Yes  [] No Location: low back  Pain Ratin-6 /10  Pain altered Tx:  [] No  [] Yes  Action:  Comments: Pt arrives she is feeling \"okay\" and notes she feels better than she did the past few days. She also states she has been feeling more pain through her left side. She attributes some of the pain increase to doing household chores over the weekend.      Objective:  Modalities:   Precautions:  Exercises: Give HEP  Exercise  Chronic low back pain    Reps/ Time Weight/ Level Comments               NUSTEP 5 min Level 2   x                 Side Lying       Hip abduction      Held  -   Clamshells 2x10    3 way x          Supine       Bridges  2x10     x   Glute squeeze 2x10 3\"   x   [place] stretch 2x30\"  Held 5/10 -   Piriformis stretch 3x10\"  With towel ; caused increased pain in anterior hip/quad 5/10- held R side -   Figure 4 stretch 2x30\"   x   Supine hip flexor stretch 3'  R side only x   PPT 2x10 3\"   x   PPT w/ marching 2x10  Held 5/17 d/t ant pain w/ hip flexion -   SAQ 2x10 1#  x          Gym       Standing hip abd, ext 2x10x ea     x   Step-up's 2x10 ea 4\" step  x    calf stretch 2x30\"     x    Total Gym 2x10 Level 16   x   Other:  Manual: Instrument assisted MFR via hypervolt to R hip flexor, inf distraction to R hip     Treatment Charges: Mins Units   []  Modalities     [x]  Ther Exercise 50 3   [x]  Manual Therapy 5 n/c   []  Ther Activities     []  Aquatics     []  Vasocompression     []  Other     Total Treatment time 50 3       Assessment: [x] Progressing toward goals. Initiated on Dorlene Power followed by standing exercises listed above. Pt performed step-ups on 6\" step this date to simulate home set-up. Only 5 reps were performed due to increased pain with higher step. Followed with manual treatment listed above to assist with decreasing tightness in hip flexor. Continued with remaining mat exercises as listed, with fatigue noted and some mild increase in bilateral hip pain. Pt has plateued in therapy and has only made minimal LE strength gains secondary to extra skin around bilateral hips from >100 lb weight loss. Patient's chart will be placed on hold this date, plan for pt to continue HEP strengthening at home and call if additional issues arise. Patient states also plans to request skin removal surgery in the future in order to more easily increase strength in BLE.     [] No change. [] Other:  [x] Patient would continue to benefit from skilled physical therapy services in order to: Decrease tightness in bilateral lumbar paraspinals, increase strength in bilateral hip abductors.  Patient will also benefit from further PNE discussion d/t pt's fibromylagia and chronic nature of pain; small discussion held regarding integrating small easy movements to decrease body's overall sensitivity. STG/LTG  STG: (to be met in 6 treatments)  1. ? Pain: Decrease pain levels to 2/10  2. ? ROM: Increase flexibility and AROM limitations throughout to equal bilat to reduce difficulty with ADLs full lumbar AROM painfree  3. ? Strength: Increase bilateral hip abd strength to 4/5  4. ? Function: Pt to report walking 1 mile without an increase in back pain. 5. Independent with Home Exercise Programs        LTG: (to be met in 20 treatments)  1. Improve score on assessment tool from 54% impaired to 25% impaired, demonstrating an improvement in ADLs  2. Reduce pain levels to 0/10                    Patient goals: To walk > one mile       Pt. Education:  [x] Yes  [] No  [x] Reviewed Prior HEP/Ed  Method of Education: [x] Verbal  [] Demo  [x] Written  Comprehension of Education:  [x] Verbalizes understanding. [x] Demonstrates understanding. [] Needs review. [x] Demonstrates/verbalizes HEP/Ed previously given. Plan: [x] Continue current frequency toward long and short term goals. [x] Specific Instructions for subsequent treatments: increase low back and hip strength as tolerated. Time In: 1:00pm            Time Out: 1:55pm    Electronically signed by:  DANIKA Panda Evaluation/treatment performed by Student PT under the supervision of co-signing PT who agrees with all evaluation/treatment and documentation.

## 2022-08-24 ENCOUNTER — TELEPHONE (OUTPATIENT)
Dept: PHARMACY | Facility: CLINIC | Age: 57
End: 2022-08-24

## 2022-08-24 NOTE — TELEPHONE ENCOUNTER
POPULATION [facility]ALTH CLINICAL PHARMACY: ADHERENCE REVIEW  Identified care gap per [place]: fills at [facility] : Statin adherence    Last Visit: 4/20/22? [id number] Records claims through 8/8/22  YTD Myles Vernon =  81%; Potential Fail Date: 9/9/22 ):   ATORVASTATIN 80 MG TABLET Next refill due: 7/27/22    Per Alcides at [address]:       Lab Results   Component Value Date    CHOL 260 (H) 07/10/2018    TRIG 107 07/10/2018    HDL 59 07/10/2018    LDLCHOLESTEROL 180 (H) 07/10/2018    LDLCALC 109 07/16/2013     ALT   Date Value Ref Range Status   07/10/2018 9 5 - 33 U/L Final     AST   Date Value Ref Range Status   07/10/2018 16 <32 U/L Final     The ASCVD Risk score ([address] et al., 2013) failed to calculate for the following reasons: The systolic blood pressure is missing    Cannot find a previous HDL lab    Cannot find a previous total cholesterol lab     PLAN  The following are interventions that have been identified:   - Patient overdue refilling ATORVASTATIN 80 MG. Unsure if patient is still prescribed this medication. Left message for patient to  refill-past due    No future appointments. Jil Rod CPhT.    [facility] free: [address] [facility] in place:  No  Recommendation Provided To: Pharmacy: 1  Intervention Detail: Refill(s) Provided  Gap Closed?: Yes   Intervention Accepted By: Pharmacy: 1  Time Spent (min): 15